# Patient Record
Sex: MALE | Race: WHITE | NOT HISPANIC OR LATINO | Employment: OTHER | URBAN - NONMETROPOLITAN AREA
[De-identification: names, ages, dates, MRNs, and addresses within clinical notes are randomized per-mention and may not be internally consistent; named-entity substitution may affect disease eponyms.]

---

## 2021-01-01 ENCOUNTER — LAB REQUISITION (OUTPATIENT)
Dept: LAB | Facility: HOSPITAL | Age: 81
End: 2021-01-01
Attending: FAMILY MEDICINE

## 2021-01-01 DIAGNOSIS — Z82.3 FAMILY HISTORY OF STROKE: ICD-10-CM

## 2021-01-01 DIAGNOSIS — D63.8 ANEMIA IN OTHER CHRONIC DISEASES CLASSIFIED ELSEWHERE: ICD-10-CM

## 2021-01-01 LAB
ALBUMIN SERPL BCP-MCNC: 2.4 G/DL (ref 3.5–5)
ALBUMIN/GLOB SERPL: 0.6 {RATIO}
ALP SERPL-CCNC: 84 U/L (ref 45–115)
ALT SERPL W P-5'-P-CCNC: 37 U/L (ref 16–61)
ANION GAP SERPL CALCULATED.3IONS-SCNC: 12 MMOL/L (ref 7–16)
AST SERPL W P-5'-P-CCNC: 17 U/L (ref 15–37)
BASOPHILS # BLD AUTO: 0.02 K/UL (ref 0–0.2)
BASOPHILS NFR BLD AUTO: 0.4 % (ref 0–1)
BILIRUB SERPL-MCNC: 0.2 MG/DL (ref 0–1.2)
BUN SERPL-MCNC: 24 MG/DL (ref 7–18)
BUN/CREAT SERPL: 23 (ref 6–20)
CALCIUM SERPL-MCNC: 8.3 MG/DL (ref 8.5–10.1)
CHLORIDE SERPL-SCNC: 103 MMOL/L (ref 98–107)
CO2 SERPL-SCNC: 29 MMOL/L (ref 21–32)
CREAT SERPL-MCNC: 1.04 MG/DL (ref 0.7–1.3)
DIFFERENTIAL METHOD BLD: ABNORMAL
EOSINOPHIL # BLD AUTO: 0.12 K/UL (ref 0–0.5)
EOSINOPHIL NFR BLD AUTO: 2.2 % (ref 1–4)
ERYTHROCYTE [DISTWIDTH] IN BLOOD BY AUTOMATED COUNT: 15.6 % (ref 11.5–14.5)
EST. AVERAGE GLUCOSE BLD GHB EST-MCNC: 100 MG/DL
GLOBULIN SER-MCNC: 3.8 G/DL (ref 2–4)
GLUCOSE SERPL-MCNC: 129 MG/DL (ref 74–106)
HBA1C MFR BLD HPLC: 5.6 % (ref 4.5–6.6)
HCT VFR BLD AUTO: 27.5 % (ref 40–54)
HGB BLD-MCNC: 8.9 G/DL (ref 13.5–18)
LYMPHOCYTES # BLD AUTO: 1.25 K/UL (ref 1–4.8)
LYMPHOCYTES NFR BLD AUTO: 22.6 % (ref 27–41)
MCH RBC QN AUTO: 26.9 PG (ref 27–31)
MCHC RBC AUTO-ENTMCNC: 32.4 G/DL (ref 32–36)
MCV RBC AUTO: 83.1 FL (ref 80–96)
MONOCYTES # BLD AUTO: 0.59 K/UL (ref 0–0.8)
MONOCYTES NFR BLD AUTO: 10.7 % (ref 2–6)
MPC BLD CALC-MCNC: 9.1 FL (ref 9.4–12.4)
NEUTROPHILS # BLD AUTO: 3.55 K/UL (ref 1.8–7.7)
NEUTROPHILS NFR BLD AUTO: 64.1 % (ref 53–65)
PLATELET # BLD AUTO: 245 K/UL (ref 150–400)
POTASSIUM SERPL-SCNC: 4 MMOL/L (ref 3.5–5.1)
PROT SERPL-MCNC: 6.2 G/DL (ref 6.4–8.2)
RBC # BLD AUTO: 3.31 M/UL (ref 4.6–6.2)
SODIUM SERPL-SCNC: 140 MMOL/L (ref 136–145)
WBC # BLD AUTO: 5.53 K/UL (ref 4.5–11)

## 2021-01-01 PROCEDURE — 83036 HEMOGLOBIN GLYCOSYLATED A1C: CPT | Performed by: FAMILY MEDICINE

## 2021-01-01 PROCEDURE — 80053 COMPREHEN METABOLIC PANEL: CPT | Performed by: FAMILY MEDICINE

## 2021-01-01 PROCEDURE — 85025 COMPLETE CBC W/AUTO DIFF WBC: CPT | Performed by: FAMILY MEDICINE

## 2022-01-01 ENCOUNTER — HOSPITAL ENCOUNTER (EMERGENCY)
Facility: HOSPITAL | Age: 82
Discharge: HOME | End: 2022-08-14
Payer: MEDICARE

## 2022-01-01 ENCOUNTER — HOSPITAL ENCOUNTER (INPATIENT)
Facility: HOSPITAL | Age: 82
LOS: 2 days | Discharge: HOME | DRG: 194 | End: 2022-03-01
Attending: FAMILY MEDICINE | Admitting: FAMILY MEDICINE
Payer: MEDICARE

## 2022-01-01 ENCOUNTER — TELEPHONE (OUTPATIENT)
Dept: EMERGENCY MEDICINE | Facility: HOSPITAL | Age: 82
End: 2022-01-01
Payer: MEDICARE

## 2022-01-01 ENCOUNTER — HOSPITAL ENCOUNTER (EMERGENCY)
Facility: HOSPITAL | Age: 82
Discharge: HOME | End: 2022-04-10
Payer: MEDICARE

## 2022-01-01 ENCOUNTER — HOSPITAL ENCOUNTER (EMERGENCY)
Facility: HOSPITAL | Age: 82
Discharge: HOME | End: 2022-07-15
Payer: MEDICARE

## 2022-01-01 ENCOUNTER — HOSPITAL ENCOUNTER (INPATIENT)
Facility: HOSPITAL | Age: 82
LOS: 1 days | Discharge: NURSING, REHAB OR LONG-TERM CARE | DRG: 057 | End: 2022-06-14
Attending: FAMILY MEDICINE | Admitting: FAMILY MEDICINE
Payer: MEDICARE

## 2022-01-01 VITALS
HEART RATE: 74 BPM | SYSTOLIC BLOOD PRESSURE: 118 MMHG | WEIGHT: 149 LBS | HEIGHT: 70 IN | BODY MASS INDEX: 21.33 KG/M2 | RESPIRATION RATE: 16 BRPM | OXYGEN SATURATION: 95 % | TEMPERATURE: 98 F | DIASTOLIC BLOOD PRESSURE: 62 MMHG

## 2022-01-01 VITALS
HEART RATE: 76 BPM | RESPIRATION RATE: 19 BRPM | DIASTOLIC BLOOD PRESSURE: 50 MMHG | WEIGHT: 149 LBS | TEMPERATURE: 98 F | HEIGHT: 70 IN | BODY MASS INDEX: 21.33 KG/M2 | SYSTOLIC BLOOD PRESSURE: 105 MMHG | OXYGEN SATURATION: 97 %

## 2022-01-01 VITALS
BODY MASS INDEX: 21.47 KG/M2 | OXYGEN SATURATION: 98 % | SYSTOLIC BLOOD PRESSURE: 112 MMHG | TEMPERATURE: 98 F | WEIGHT: 150 LBS | HEIGHT: 70 IN | HEART RATE: 88 BPM | DIASTOLIC BLOOD PRESSURE: 62 MMHG | RESPIRATION RATE: 16 BRPM

## 2022-01-01 VITALS
WEIGHT: 150 LBS | BODY MASS INDEX: 21.47 KG/M2 | HEIGHT: 70 IN | DIASTOLIC BLOOD PRESSURE: 68 MMHG | RESPIRATION RATE: 12 BRPM | OXYGEN SATURATION: 99 % | HEART RATE: 95 BPM | TEMPERATURE: 98 F | SYSTOLIC BLOOD PRESSURE: 123 MMHG

## 2022-01-01 VITALS
RESPIRATION RATE: 20 BRPM | SYSTOLIC BLOOD PRESSURE: 114 MMHG | HEART RATE: 87 BPM | TEMPERATURE: 98 F | DIASTOLIC BLOOD PRESSURE: 46 MMHG | WEIGHT: 152 LBS | BODY MASS INDEX: 20.59 KG/M2 | OXYGEN SATURATION: 96 % | HEIGHT: 72 IN

## 2022-01-01 DIAGNOSIS — E86.0 DEHYDRATION: Primary | ICD-10-CM

## 2022-01-01 DIAGNOSIS — N17.9 AKI (ACUTE KIDNEY INJURY): ICD-10-CM

## 2022-01-01 DIAGNOSIS — I95.9 HYPOTENSION: ICD-10-CM

## 2022-01-01 DIAGNOSIS — Z86.59 MENTAL STATUS CHANGE RESOLVED: ICD-10-CM

## 2022-01-01 DIAGNOSIS — R19.5 POSITIVE OCCULT STOOL BLOOD TEST: Primary | ICD-10-CM

## 2022-01-01 DIAGNOSIS — R41.82 ALTERED MENTAL STATUS, UNSPECIFIED: Primary | ICD-10-CM

## 2022-01-01 DIAGNOSIS — J18.9 PNEUMONIA OF LEFT LOWER LOBE DUE TO INFECTIOUS ORGANISM: Primary | ICD-10-CM

## 2022-01-01 DIAGNOSIS — R11.10 VOMITING, INTRACTABILITY OF VOMITING NOT SPECIFIED, PRESENCE OF NAUSEA NOT SPECIFIED, UNSPECIFIED VOMITING TYPE: ICD-10-CM

## 2022-01-01 DIAGNOSIS — D64.9 SYMPTOMATIC ANEMIA: Primary | ICD-10-CM

## 2022-01-01 DIAGNOSIS — I95.9 HYPOTENSIVE EPISODE: ICD-10-CM

## 2022-01-01 DIAGNOSIS — R19.5 OCCULT BLOOD POSITIVE STOOL: ICD-10-CM

## 2022-01-01 DIAGNOSIS — D64.9 ANEMIA: ICD-10-CM

## 2022-01-01 LAB
ALBUMIN SERPL BCP-MCNC: 1.8 G/DL (ref 3.5–5)
ALBUMIN SERPL BCP-MCNC: 2.2 G/DL (ref 3.5–5)
ALBUMIN/GLOB SERPL: 0.4 {RATIO}
ALBUMIN/GLOB SERPL: 0.4 {RATIO}
ALBUMIN/GLOB SERPL: 0.5 {RATIO}
ALBUMIN/GLOB SERPL: 0.5 {RATIO}
ALP SERPL-CCNC: 73 U/L (ref 45–115)
ALP SERPL-CCNC: 74 U/L (ref 45–115)
ALP SERPL-CCNC: 93 U/L (ref 45–115)
ALP SERPL-CCNC: 96 U/L (ref 45–115)
ALT SERPL W P-5'-P-CCNC: 10 U/L (ref 16–61)
ALT SERPL W P-5'-P-CCNC: 5 U/L (ref 16–61)
ALT SERPL W P-5'-P-CCNC: 7 U/L (ref 16–61)
ALT SERPL W P-5'-P-CCNC: 9 U/L (ref 16–61)
ANION GAP SERPL CALCULATED.3IONS-SCNC: 10 MMOL/L (ref 7–16)
ANION GAP SERPL CALCULATED.3IONS-SCNC: 10 MMOL/L (ref 7–16)
ANION GAP SERPL CALCULATED.3IONS-SCNC: 11 MMOL/L (ref 7–16)
ANION GAP SERPL CALCULATED.3IONS-SCNC: 12 MMOL/L (ref 7–16)
ANION GAP SERPL CALCULATED.3IONS-SCNC: 13 MMOL/L (ref 7–16)
ANISOCYTOSIS BLD QL SMEAR: ABNORMAL
ANISOCYTOSIS BLD QL SMEAR: ABNORMAL
AST SERPL W P-5'-P-CCNC: 12 U/L (ref 15–37)
AST SERPL W P-5'-P-CCNC: 12 U/L (ref 15–37)
AST SERPL W P-5'-P-CCNC: 13 U/L (ref 15–37)
AST SERPL W P-5'-P-CCNC: 17 U/L (ref 15–37)
BACTERIA #/AREA URNS HPF: ABNORMAL /HPF
BACTERIA BLD CULT: NORMAL
BACTERIA BLD CULT: NORMAL
BASOPHILS # BLD AUTO: 0.01 K/UL (ref 0–0.2)
BASOPHILS # BLD AUTO: 0.01 K/UL (ref 0–0.2)
BASOPHILS # BLD AUTO: 0.02 K/UL (ref 0–0.2)
BASOPHILS # BLD AUTO: 0.07 K/UL (ref 0–0.2)
BASOPHILS NFR BLD AUTO: 0.2 % (ref 0–1)
BASOPHILS NFR BLD AUTO: 0.4 % (ref 0–1)
BASOPHILS NFR BLD AUTO: 0.4 % (ref 0–1)
BASOPHILS NFR BLD AUTO: 0.5 % (ref 0–1)
BASOPHILS NFR BLD AUTO: 1.1 % (ref 0–1)
BILIRUB SERPL-MCNC: 0.2 MG/DL (ref 0–1.2)
BILIRUB SERPL-MCNC: 0.3 MG/DL (ref 0–1.2)
BILIRUB SERPL-MCNC: 0.6 MG/DL (ref 0–1.2)
BILIRUB SERPL-MCNC: 0.7 MG/DL (ref 0–1.2)
BILIRUB UR QL STRIP: ABNORMAL
BUN SERPL-MCNC: 14 MG/DL (ref 7–18)
BUN SERPL-MCNC: 17 MG/DL (ref 7–18)
BUN SERPL-MCNC: 21 MG/DL (ref 7–18)
BUN SERPL-MCNC: 25 MG/DL (ref 7–18)
BUN SERPL-MCNC: 33 MG/DL (ref 7–18)
BUN/CREAT SERPL: 18 (ref 6–20)
BUN/CREAT SERPL: 24 (ref 6–20)
BUN/CREAT SERPL: 25 (ref 6–20)
BUN/CREAT SERPL: 26 (ref 6–20)
BUN/CREAT SERPL: 28 (ref 6–20)
BUN/CREAT SERPL: 30 (ref 6–20)
BUN/CREAT SERPL: 32 (ref 6–20)
CALCIUM SERPL-MCNC: 10.1 MG/DL (ref 8.5–10.1)
CALCIUM SERPL-MCNC: 8.1 MG/DL (ref 8.5–10.1)
CALCIUM SERPL-MCNC: 8.5 MG/DL (ref 8.5–10.1)
CALCIUM SERPL-MCNC: 8.7 MG/DL (ref 8.5–10.1)
CALCIUM SERPL-MCNC: 9.2 MG/DL (ref 8.5–10.1)
CALCIUM SERPL-MCNC: 9.2 MG/DL (ref 8.5–10.1)
CALCIUM SERPL-MCNC: 9.8 MG/DL (ref 8.5–10.1)
CHLORIDE SERPL-SCNC: 104 MMOL/L (ref 98–107)
CHLORIDE SERPL-SCNC: 105 MMOL/L (ref 98–107)
CHLORIDE SERPL-SCNC: 105 MMOL/L (ref 98–107)
CHLORIDE SERPL-SCNC: 107 MMOL/L (ref 98–107)
CHLORIDE SERPL-SCNC: 107 MMOL/L (ref 98–107)
CHLORIDE SERPL-SCNC: 108 MMOL/L (ref 98–107)
CHLORIDE SERPL-SCNC: 111 MMOL/L (ref 98–107)
CLARITY UR: ABNORMAL
CO2 SERPL-SCNC: 27 MMOL/L (ref 21–32)
CO2 SERPL-SCNC: 27 MMOL/L (ref 21–32)
CO2 SERPL-SCNC: 28 MMOL/L (ref 21–32)
CO2 SERPL-SCNC: 29 MMOL/L (ref 21–32)
CO2 SERPL-SCNC: 30 MMOL/L (ref 21–32)
CO2 SERPL-SCNC: 30 MMOL/L (ref 21–32)
CO2 SERPL-SCNC: 32 MMOL/L (ref 21–32)
COLOR UR: YELLOW
CREAT SERPL-MCNC: 0.7 MG/DL (ref 0.7–1.3)
CREAT SERPL-MCNC: 0.8 MG/DL (ref 0.7–1.3)
CREAT SERPL-MCNC: 0.83 MG/DL (ref 0.7–1.3)
CREAT SERPL-MCNC: 0.83 MG/DL (ref 0.7–1.3)
CREAT SERPL-MCNC: 0.9 MG/DL (ref 0.7–1.3)
CREAT SERPL-MCNC: 0.97 MG/DL (ref 0.7–1.3)
CREAT SERPL-MCNC: 1.02 MG/DL (ref 0.7–1.3)
DIFFERENTIAL METHOD BLD: ABNORMAL
EGFR (NO RACE VARIABLE) (RUSH/TITUS): 74 ML/MIN/1.73M²
EOSINOPHIL # BLD AUTO: 0.04 K/UL (ref 0–0.5)
EOSINOPHIL # BLD AUTO: 0.07 K/UL (ref 0–0.5)
EOSINOPHIL # BLD AUTO: 0.07 K/UL (ref 0–0.5)
EOSINOPHIL # BLD AUTO: 0.09 K/UL (ref 0–0.5)
EOSINOPHIL # BLD AUTO: 0.09 K/UL (ref 0–0.5)
EOSINOPHIL # BLD AUTO: 0.22 K/UL (ref 0–0.5)
EOSINOPHIL # BLD AUTO: 0.25 K/UL (ref 0–0.5)
EOSINOPHIL NFR BLD AUTO: 0.4 % (ref 1–4)
EOSINOPHIL NFR BLD AUTO: 1.1 % (ref 1–4)
EOSINOPHIL NFR BLD AUTO: 1.5 % (ref 1–4)
EOSINOPHIL NFR BLD AUTO: 1.6 % (ref 1–4)
EOSINOPHIL NFR BLD AUTO: 1.7 % (ref 1–4)
EOSINOPHIL NFR BLD AUTO: 4.7 % (ref 1–4)
EOSINOPHIL NFR BLD AUTO: 5 % (ref 1–4)
EOSINOPHIL NFR BLD MANUAL: 4 % (ref 1–4)
ERYTHROCYTE [DISTWIDTH] IN BLOOD BY AUTOMATED COUNT: 15.7 % (ref 11.5–14.5)
ERYTHROCYTE [DISTWIDTH] IN BLOOD BY AUTOMATED COUNT: 17 % (ref 11.5–14.5)
ERYTHROCYTE [DISTWIDTH] IN BLOOD BY AUTOMATED COUNT: 18.1 % (ref 11.5–14.5)
ERYTHROCYTE [DISTWIDTH] IN BLOOD BY AUTOMATED COUNT: 18.5 % (ref 11.5–14.5)
ERYTHROCYTE [DISTWIDTH] IN BLOOD BY AUTOMATED COUNT: 20 % (ref 11.5–14.5)
ERYTHROCYTE [DISTWIDTH] IN BLOOD BY AUTOMATED COUNT: 20.7 % (ref 11.5–14.5)
ERYTHROCYTE [DISTWIDTH] IN BLOOD BY AUTOMATED COUNT: 21 % (ref 11.5–14.5)
FERRITIN SERPL-MCNC: 115 NG/ML (ref 26–388)
FLUAV AG UPPER RESP QL IA.RAPID: NEGATIVE
FLUBV AG UPPER RESP QL IA.RAPID: NEGATIVE
GLOBULIN SER-MCNC: 3.7 G/DL (ref 2–4)
GLOBULIN SER-MCNC: 4.4 G/DL (ref 2–4)
GLOBULIN SER-MCNC: 5.3 G/DL (ref 2–4)
GLOBULIN SER-MCNC: 5.5 G/DL (ref 2–4)
GLUCOSE SERPL-MCNC: 107 MG/DL (ref 74–106)
GLUCOSE SERPL-MCNC: 123 MG/DL (ref 74–106)
GLUCOSE SERPL-MCNC: 126 MG/DL (ref 70–105)
GLUCOSE SERPL-MCNC: 131 MG/DL (ref 74–106)
GLUCOSE SERPL-MCNC: 71 MG/DL (ref 74–106)
GLUCOSE SERPL-MCNC: 72 MG/DL (ref 74–106)
GLUCOSE SERPL-MCNC: 72 MG/DL (ref 74–106)
GLUCOSE SERPL-MCNC: 80 MG/DL (ref 74–106)
GLUCOSE UR STRIP-MCNC: NEGATIVE MG/DL
GROUP & RH: NORMAL
HCT VFR BLD AUTO: 25.7 % (ref 40–54)
HCT VFR BLD AUTO: 27.8 % (ref 40–54)
HCT VFR BLD AUTO: 27.9 % (ref 40–54)
HCT VFR BLD AUTO: 29.1 % (ref 40–54)
HCT VFR BLD AUTO: 30 % (ref 40–54)
HCT VFR BLD AUTO: 32.4 % (ref 40–54)
HCT VFR BLD AUTO: 35.7 % (ref 40–54)
HGB BLD-MCNC: 10.2 G/DL (ref 13.5–18)
HGB BLD-MCNC: 10.6 G/DL (ref 13.5–18)
HGB BLD-MCNC: 8.5 G/DL (ref 13.5–18)
HGB BLD-MCNC: 8.8 G/DL (ref 13.5–18)
HGB BLD-MCNC: 9 G/DL (ref 13.5–18)
HGB BLD-MCNC: 9.2 G/DL (ref 13.5–18)
HGB BLD-MCNC: 9.8 G/DL (ref 13.5–18)
HYPOCHROMIA BLD QL SMEAR: ABNORMAL
IMM RETICS NFR: 23.3 % (ref 2.3–13.4)
INDIRECT COOMBS GEL: NEGATIVE
IRON SATN MFR SERPL: 11 % (ref 14–50)
IRON SERPL-MCNC: 16 ΜG/DL (ref 65–175)
KETONES UR STRIP-SCNC: 15 MG/DL
LACTATE SERPL-SCNC: 1 MMOL/L (ref 0.4–2)
LEUKOCYTE ESTERASE UR QL STRIP: NEGATIVE
LIPASE SERPL-CCNC: 15 U/L (ref 73–393)
LYMPHOCYTES # BLD AUTO: 0.83 K/UL (ref 1–4.8)
LYMPHOCYTES # BLD AUTO: 0.86 K/UL (ref 1–4.8)
LYMPHOCYTES # BLD AUTO: 0.86 K/UL (ref 1–4.8)
LYMPHOCYTES # BLD AUTO: 0.91 K/UL (ref 1–4.8)
LYMPHOCYTES # BLD AUTO: 1.1 K/UL (ref 1–4.8)
LYMPHOCYTES # BLD AUTO: 1.18 K/UL (ref 1–4.8)
LYMPHOCYTES # BLD AUTO: 1.51 K/UL (ref 1–4.8)
LYMPHOCYTES NFR BLD AUTO: 15.4 % (ref 27–41)
LYMPHOCYTES NFR BLD AUTO: 17.3 % (ref 27–41)
LYMPHOCYTES NFR BLD AUTO: 18.6 % (ref 27–41)
LYMPHOCYTES NFR BLD AUTO: 22.1 % (ref 27–41)
LYMPHOCYTES NFR BLD AUTO: 24.2 % (ref 27–41)
LYMPHOCYTES NFR BLD AUTO: 24.9 % (ref 27–41)
LYMPHOCYTES NFR BLD AUTO: 9.2 % (ref 27–41)
LYMPHOCYTES NFR BLD MANUAL: 17 % (ref 27–41)
LYMPHOCYTES NFR BLD MANUAL: 19 % (ref 27–41)
LYMPHOCYTES NFR BLD MANUAL: 22 % (ref 27–41)
LYMPHOCYTES NFR BLD MANUAL: 27 % (ref 27–41)
MAGNESIUM SERPL-MCNC: 1.8 MG/DL (ref 1.7–2.3)
MAGNESIUM SERPL-MCNC: 1.8 MG/DL (ref 1.7–2.3)
MAGNESIUM SERPL-MCNC: 2.1 MG/DL (ref 1.7–2.3)
MCH RBC QN AUTO: 25.3 PG (ref 27–31)
MCH RBC QN AUTO: 25.6 PG (ref 27–31)
MCH RBC QN AUTO: 25.8 PG (ref 27–31)
MCH RBC QN AUTO: 26.2 PG (ref 27–31)
MCH RBC QN AUTO: 26.9 PG (ref 27–31)
MCH RBC QN AUTO: 31.8 PG (ref 27–31)
MCH RBC QN AUTO: 32.4 PG (ref 27–31)
MCHC RBC AUTO-ENTMCNC: 28.6 G/DL (ref 32–36)
MCHC RBC AUTO-ENTMCNC: 30.2 G/DL (ref 32–36)
MCHC RBC AUTO-ENTMCNC: 30.5 G/DL (ref 32–36)
MCHC RBC AUTO-ENTMCNC: 30.7 G/DL (ref 32–36)
MCHC RBC AUTO-ENTMCNC: 31.7 G/DL (ref 32–36)
MCHC RBC AUTO-ENTMCNC: 35 G/DL (ref 32–36)
MCHC RBC AUTO-ENTMCNC: 36.4 G/DL (ref 32–36)
MCV RBC AUTO: 83.6 FL (ref 80–96)
MCV RBC AUTO: 83.7 FL (ref 80–96)
MCV RBC AUTO: 85 FL (ref 80–96)
MCV RBC AUTO: 85.8 FL (ref 80–96)
MCV RBC AUTO: 89 FL (ref 80–96)
MCV RBC AUTO: 90.2 FL (ref 80–96)
MCV RBC AUTO: 90.8 FL (ref 80–96)
MICROCYTES BLD QL SMEAR: ABNORMAL
MICROCYTES BLD QL SMEAR: ABNORMAL
MONOCYTES # BLD AUTO: 0.45 K/UL (ref 0–0.8)
MONOCYTES # BLD AUTO: 0.46 K/UL (ref 0–0.8)
MONOCYTES # BLD AUTO: 0.46 K/UL (ref 0–0.8)
MONOCYTES # BLD AUTO: 0.48 K/UL (ref 0–0.8)
MONOCYTES # BLD AUTO: 0.52 K/UL (ref 0–0.8)
MONOCYTES # BLD AUTO: 0.59 K/UL (ref 0–0.8)
MONOCYTES # BLD AUTO: 0.76 K/UL (ref 0–0.8)
MONOCYTES NFR BLD AUTO: 11.2 % (ref 2–6)
MONOCYTES NFR BLD AUTO: 11.8 % (ref 2–6)
MONOCYTES NFR BLD AUTO: 13.6 % (ref 2–6)
MONOCYTES NFR BLD AUTO: 5.3 % (ref 2–6)
MONOCYTES NFR BLD AUTO: 7.4 % (ref 2–6)
MONOCYTES NFR BLD AUTO: 8.6 % (ref 2–6)
MONOCYTES NFR BLD AUTO: 9.7 % (ref 2–6)
MONOCYTES NFR BLD MANUAL: 15 % (ref 2–6)
MONOCYTES NFR BLD MANUAL: 6 % (ref 2–6)
MONOCYTES NFR BLD MANUAL: 6 % (ref 2–6)
MONOCYTES NFR BLD MANUAL: 8 % (ref 2–6)
MPC BLD CALC-MCNC: 8.6 FL (ref 9.4–12.4)
MPC BLD CALC-MCNC: 8.8 FL (ref 9.4–12.4)
MPC BLD CALC-MCNC: 8.9 FL (ref 9.4–12.4)
MPC BLD CALC-MCNC: 9 FL (ref 9.4–12.4)
MPC BLD CALC-MCNC: 9.1 FL (ref 9.4–12.4)
MPC BLD CALC-MCNC: 9.2 FL (ref 9.4–12.4)
MPC BLD CALC-MCNC: 9.3 FL (ref 9.4–12.4)
MUCOUS THREADS #/AREA URNS HPF: ABNORMAL /HPF
NEUTROPHILS # BLD AUTO: 2.56 K/UL (ref 1.8–7.7)
NEUTROPHILS # BLD AUTO: 3.24 K/UL (ref 1.8–7.7)
NEUTROPHILS # BLD AUTO: 3.42 K/UL (ref 1.8–7.7)
NEUTROPHILS # BLD AUTO: 3.65 K/UL (ref 1.8–7.7)
NEUTROPHILS # BLD AUTO: 3.85 K/UL (ref 1.8–7.7)
NEUTROPHILS # BLD AUTO: 4.14 K/UL (ref 1.8–7.7)
NEUTROPHILS # BLD AUTO: 7.69 K/UL (ref 1.8–7.7)
NEUTROPHILS NFR BLD AUTO: 57.8 % (ref 53–65)
NEUTROPHILS NFR BLD AUTO: 64.2 % (ref 53–65)
NEUTROPHILS NFR BLD AUTO: 66.2 % (ref 53–65)
NEUTROPHILS NFR BLD AUTO: 69 % (ref 53–65)
NEUTROPHILS NFR BLD AUTO: 69.6 % (ref 53–65)
NEUTROPHILS NFR BLD AUTO: 70 % (ref 53–65)
NEUTROPHILS NFR BLD AUTO: 84.9 % (ref 53–65)
NEUTS BAND NFR BLD MANUAL: 3 % (ref 1–5)
NEUTS BAND NFR BLD MANUAL: 5 % (ref 1–5)
NEUTS SEG NFR BLD MANUAL: 61 % (ref 50–62)
NEUTS SEG NFR BLD MANUAL: 67 % (ref 50–62)
NEUTS SEG NFR BLD MANUAL: 68 % (ref 50–62)
NEUTS SEG NFR BLD MANUAL: 72 % (ref 50–62)
NITRITE UR QL STRIP: POSITIVE
NRBC BLD MANUAL-RTO: ABNORMAL %
NT-PROBNP SERPL-MCNC: 549 PG/ML (ref 1–450)
NT-PROBNP SERPL-MCNC: 633 PG/ML (ref 1–450)
OCCULT BLOOD: POSITIVE
PH UR STRIP: 6 PH UNITS
PLATELET # BLD AUTO: 207 K/UL (ref 150–400)
PLATELET # BLD AUTO: 226 K/UL (ref 150–400)
PLATELET # BLD AUTO: 232 K/UL (ref 150–400)
PLATELET # BLD AUTO: 266 K/UL (ref 150–400)
PLATELET # BLD AUTO: 279 K/UL (ref 150–400)
PLATELET # BLD AUTO: 301 K/UL (ref 150–400)
PLATELET # BLD AUTO: 319 K/UL (ref 150–400)
PLATELET MORPHOLOGY: NORMAL
POIKILOCYTOSIS BLD QL SMEAR: ABNORMAL
POTASSIUM SERPL-SCNC: 3.4 MMOL/L (ref 3.5–5.1)
POTASSIUM SERPL-SCNC: 3.6 MMOL/L (ref 3.5–5.1)
POTASSIUM SERPL-SCNC: 3.7 MMOL/L (ref 3.5–5.1)
POTASSIUM SERPL-SCNC: 3.8 MMOL/L (ref 3.5–5.1)
POTASSIUM SERPL-SCNC: 3.8 MMOL/L (ref 3.5–5.1)
POTASSIUM SERPL-SCNC: 3.9 MMOL/L (ref 3.5–5.1)
POTASSIUM SERPL-SCNC: 4.3 MMOL/L (ref 3.5–5.1)
PREALB SERPL NEPH-MCNC: 7 MG/DL (ref 20–40)
PROT SERPL-MCNC: 5.5 G/DL (ref 6.4–8.2)
PROT SERPL-MCNC: 6.6 G/DL (ref 6.4–8.2)
PROT SERPL-MCNC: 7.5 G/DL (ref 6.4–8.2)
PROT SERPL-MCNC: 7.7 G/DL (ref 6.4–8.2)
PROT UR QL STRIP: ABNORMAL
RBC # BLD AUTO: 2.83 M/UL (ref 4.6–6.2)
RBC # BLD AUTO: 3.25 M/UL (ref 4.6–6.2)
RBC # BLD AUTO: 3.27 M/UL (ref 4.6–6.2)
RBC # BLD AUTO: 3.27 M/UL (ref 4.6–6.2)
RBC # BLD AUTO: 3.59 M/UL (ref 4.6–6.2)
RBC # BLD AUTO: 3.87 M/UL (ref 4.6–6.2)
RBC # BLD AUTO: 3.96 M/UL (ref 4.6–6.2)
RBC # UR STRIP: NEGATIVE /UL
RBC #/AREA URNS HPF: ABNORMAL /HPF
RETICS # AUTO: 0.04 X10E6/UL (ref 0.02–0.11)
RETICS/RBC NFR AUTO: 1.5 % (ref 0.4–2.2)
SARS-COV+SARS-COV-2 AG RESP QL IA.RAPID: NEGATIVE
SARS-COV+SARS-COV-2 AG RESP QL IA.RAPID: NEGATIVE
SODIUM SERPL-SCNC: 139 MMOL/L (ref 136–145)
SODIUM SERPL-SCNC: 141 MMOL/L (ref 136–145)
SODIUM SERPL-SCNC: 142 MMOL/L (ref 136–145)
SODIUM SERPL-SCNC: 143 MMOL/L (ref 136–145)
SODIUM SERPL-SCNC: 144 MMOL/L (ref 136–145)
SODIUM SERPL-SCNC: 146 MMOL/L (ref 136–145)
SODIUM SERPL-SCNC: 150 MMOL/L (ref 136–145)
SP GR UR STRIP: >=1.03
SQUAMOUS #/AREA URNS LPF: ABNORMAL /LPF
TIBC SERPL-MCNC: 140 ΜG/DL (ref 250–450)
TROPONIN I SERPL HS-MCNC: 4.3 PG/ML
TROPONIN I SERPL HS-MCNC: 6.9 PG/ML
TROPONIN I SERPL HS-MCNC: 7.7 PG/ML
UROBILINOGEN UR STRIP-ACNC: 0.2 MG/DL
WBC # BLD AUTO: 4.42 K/UL (ref 4.5–11)
WBC # BLD AUTO: 4.63 K/UL (ref 4.5–11)
WBC # BLD AUTO: 5.25 K/UL (ref 4.5–11)
WBC # BLD AUTO: 5.33 K/UL (ref 4.5–11)
WBC # BLD AUTO: 5.58 K/UL (ref 4.5–11)
WBC # BLD AUTO: 6.25 K/UL (ref 4.5–11)
WBC # BLD AUTO: 9.06 K/UL (ref 4.5–11)
WBC #/AREA URNS HPF: ABNORMAL /HPF

## 2022-01-01 PROCEDURE — 99285 EMERGENCY DEPT VISIT HI MDM: CPT | Mod: 25

## 2022-01-01 PROCEDURE — 84484 ASSAY OF TROPONIN QUANT: CPT | Performed by: NURSE PRACTITIONER

## 2022-01-01 PROCEDURE — 93010 EKG 12-LEAD: ICD-10-PCS | Mod: ,,, | Performed by: INTERNAL MEDICINE

## 2022-01-01 PROCEDURE — 36415 COLL VENOUS BLD VENIPUNCTURE: CPT | Performed by: NURSE PRACTITIONER

## 2022-01-01 PROCEDURE — 80048 BASIC METABOLIC PNL TOTAL CA: CPT | Performed by: NURSE PRACTITIONER

## 2022-01-01 PROCEDURE — 92610 EVALUATE SWALLOWING FUNCTION: CPT

## 2022-01-01 PROCEDURE — 82962 GLUCOSE BLOOD TEST: CPT

## 2022-01-01 PROCEDURE — 80053 COMPREHEN METABOLIC PANEL: CPT | Performed by: NURSE PRACTITIONER

## 2022-01-01 PROCEDURE — 99283 EMERGENCY DEPT VISIT LOW MDM: CPT | Performed by: NURSE PRACTITIONER

## 2022-01-01 PROCEDURE — 93010 EKG 12-LEAD: ICD-10-PCS | Mod: ,,, | Performed by: STUDENT IN AN ORGANIZED HEALTH CARE EDUCATION/TRAINING PROGRAM

## 2022-01-01 PROCEDURE — 27000221 HC OXYGEN, UP TO 24 HOURS

## 2022-01-01 PROCEDURE — 93005 ELECTROCARDIOGRAM TRACING: CPT

## 2022-01-01 PROCEDURE — 99222 PR INITIAL HOSPITAL CARE,LEVL II: ICD-10-PCS | Mod: AI,,, | Performed by: FAMILY MEDICINE

## 2022-01-01 PROCEDURE — 25000003 PHARM REV CODE 250: Performed by: REGISTERED NURSE

## 2022-01-01 PROCEDURE — 99238 HOSP IP/OBS DSCHRG MGMT 30/<: CPT | Mod: ,,, | Performed by: EMERGENCY MEDICINE

## 2022-01-01 PROCEDURE — 97165 OT EVAL LOW COMPLEX 30 MIN: CPT

## 2022-01-01 PROCEDURE — 94761 N-INVAS EAR/PLS OXIMETRY MLT: CPT

## 2022-01-01 PROCEDURE — 11000001 HC ACUTE MED/SURG PRIVATE ROOM

## 2022-01-01 PROCEDURE — 99284 EMERGENCY DEPT VISIT MOD MDM: CPT | Mod: GF | Performed by: NURSE PRACTITIONER

## 2022-01-01 PROCEDURE — 96360 HYDRATION IV INFUSION INIT: CPT

## 2022-01-01 PROCEDURE — 99222 1ST HOSP IP/OBS MODERATE 55: CPT | Mod: AI,,, | Performed by: FAMILY MEDICINE

## 2022-01-01 PROCEDURE — 83735 ASSAY OF MAGNESIUM: CPT | Performed by: REGISTERED NURSE

## 2022-01-01 PROCEDURE — 99900035 HC TECH TIME PER 15 MIN (STAT)

## 2022-01-01 PROCEDURE — 85025 COMPLETE CBC W/AUTO DIFF WBC: CPT | Performed by: NURSE PRACTITIONER

## 2022-01-01 PROCEDURE — 86920 COMPATIBILITY TEST SPIN: CPT | Performed by: FAMILY MEDICINE

## 2022-01-01 PROCEDURE — 25000003 PHARM REV CODE 250: Performed by: NURSE PRACTITIONER

## 2022-01-01 PROCEDURE — 93010 ELECTROCARDIOGRAM REPORT: CPT | Mod: ,,, | Performed by: STUDENT IN AN ORGANIZED HEALTH CARE EDUCATION/TRAINING PROGRAM

## 2022-01-01 PROCEDURE — 63600175 PHARM REV CODE 636 W HCPCS: Performed by: REGISTERED NURSE

## 2022-01-01 PROCEDURE — 63600175 PHARM REV CODE 636 W HCPCS: Performed by: FAMILY MEDICINE

## 2022-01-01 PROCEDURE — 85045 AUTOMATED RETICULOCYTE COUNT: CPT | Performed by: NURSE PRACTITIONER

## 2022-01-01 PROCEDURE — 82728 ASSAY OF FERRITIN: CPT | Performed by: NURSE PRACTITIONER

## 2022-01-01 PROCEDURE — 81001 URINALYSIS AUTO W/SCOPE: CPT | Performed by: NURSE PRACTITIONER

## 2022-01-01 PROCEDURE — 87428 SARSCOV & INF VIR A&B AG IA: CPT | Performed by: REGISTERED NURSE

## 2022-01-01 PROCEDURE — 87426 SARSCOV CORONAVIRUS AG IA: CPT | Performed by: NURSE PRACTITIONER

## 2022-01-01 PROCEDURE — 83735 ASSAY OF MAGNESIUM: CPT | Performed by: NURSE PRACTITIONER

## 2022-01-01 PROCEDURE — 83880 ASSAY OF NATRIURETIC PEPTIDE: CPT | Performed by: NURSE PRACTITIONER

## 2022-01-01 PROCEDURE — 83540 ASSAY OF IRON: CPT | Performed by: NURSE PRACTITIONER

## 2022-01-01 PROCEDURE — 86850 RBC ANTIBODY SCREEN: CPT | Performed by: FAMILY MEDICINE

## 2022-01-01 PROCEDURE — 99222 1ST HOSP IP/OBS MODERATE 55: CPT | Mod: ,,, | Performed by: NURSE PRACTITIONER

## 2022-01-01 PROCEDURE — P9016 RBC LEUKOCYTES REDUCED: HCPCS | Performed by: FAMILY MEDICINE

## 2022-01-01 PROCEDURE — 36430 TRANSFUSION BLD/BLD COMPNT: CPT

## 2022-01-01 PROCEDURE — 99222 PR INITIAL HOSPITAL CARE,LEVL II: ICD-10-PCS | Mod: ,,, | Performed by: NURSE PRACTITIONER

## 2022-01-01 PROCEDURE — 80048 BASIC METABOLIC PNL TOTAL CA: CPT | Performed by: REGISTERED NURSE

## 2022-01-01 PROCEDURE — 80053 COMPREHEN METABOLIC PANEL: CPT | Performed by: REGISTERED NURSE

## 2022-01-01 PROCEDURE — 85025 COMPLETE CBC W/AUTO DIFF WBC: CPT | Performed by: REGISTERED NURSE

## 2022-01-01 PROCEDURE — 97162 PT EVAL MOD COMPLEX 30 MIN: CPT

## 2022-01-01 PROCEDURE — 86900 BLOOD TYPING SEROLOGIC ABO: CPT | Performed by: FAMILY MEDICINE

## 2022-01-01 PROCEDURE — 99239 PR HOSPITAL DISCHARGE DAY,>30 MIN: ICD-10-PCS | Mod: ,,, | Performed by: NURSE PRACTITIONER

## 2022-01-01 PROCEDURE — 36415 COLL VENOUS BLD VENIPUNCTURE: CPT | Performed by: REGISTERED NURSE

## 2022-01-01 PROCEDURE — 87040 BLOOD CULTURE FOR BACTERIA: CPT | Performed by: NURSE PRACTITIONER

## 2022-01-01 PROCEDURE — 86901 BLOOD TYPING SEROLOGIC RH(D): CPT | Performed by: FAMILY MEDICINE

## 2022-01-01 PROCEDURE — 84484 ASSAY OF TROPONIN QUANT: CPT | Performed by: REGISTERED NURSE

## 2022-01-01 PROCEDURE — 83605 ASSAY OF LACTIC ACID: CPT | Performed by: NURSE PRACTITIONER

## 2022-01-01 PROCEDURE — 99238 PR HOSPITAL DISCHARGE DAY,<30 MIN: ICD-10-PCS | Mod: ,,, | Performed by: EMERGENCY MEDICINE

## 2022-01-01 PROCEDURE — 96365 THER/PROPH/DIAG IV INF INIT: CPT

## 2022-01-01 PROCEDURE — 96361 HYDRATE IV INFUSION ADD-ON: CPT

## 2022-01-01 PROCEDURE — 84134 ASSAY OF PREALBUMIN: CPT | Performed by: NURSE PRACTITIONER

## 2022-01-01 PROCEDURE — 99285 EMERGENCY DEPT VISIT HI MDM: CPT | Mod: GF | Performed by: NURSE PRACTITIONER

## 2022-01-01 PROCEDURE — 93010 ELECTROCARDIOGRAM REPORT: CPT | Mod: ,,, | Performed by: INTERNAL MEDICINE

## 2022-01-01 PROCEDURE — 99239 HOSP IP/OBS DSCHRG MGMT >30: CPT | Mod: ,,, | Performed by: NURSE PRACTITIONER

## 2022-01-01 PROCEDURE — 99283 EMERGENCY DEPT VISIT LOW MDM: CPT | Mod: GF | Performed by: REGISTERED NURSE

## 2022-01-01 PROCEDURE — 83690 ASSAY OF LIPASE: CPT | Performed by: NURSE PRACTITIONER

## 2022-01-01 RX ORDER — FAMOTIDINE 20 MG/1
20 TABLET, FILM COATED ORAL 2 TIMES DAILY
Status: DISCONTINUED | OUTPATIENT
Start: 2022-01-01 | End: 2022-01-01 | Stop reason: HOSPADM

## 2022-01-01 RX ORDER — ACETAMINOPHEN 325 MG/1
650 TABLET ORAL EVERY 6 HOURS PRN
Status: DISCONTINUED | OUTPATIENT
Start: 2022-01-01 | End: 2022-01-01 | Stop reason: HOSPADM

## 2022-01-01 RX ORDER — TALC
6 POWDER (GRAM) TOPICAL NIGHTLY PRN
Status: DISCONTINUED | OUTPATIENT
Start: 2022-01-01 | End: 2022-01-01 | Stop reason: HOSPADM

## 2022-01-01 RX ORDER — AMOXICILLIN 250 MG
1 CAPSULE ORAL 2 TIMES DAILY
Status: DISCONTINUED | OUTPATIENT
Start: 2022-01-01 | End: 2022-01-01 | Stop reason: HOSPADM

## 2022-01-01 RX ORDER — LEVOFLOXACIN 500 MG/1
500 TABLET, FILM COATED ORAL DAILY
Qty: 5 TABLET | Refills: 0 | OUTPATIENT
Start: 2022-01-01 | End: 2022-01-01

## 2022-01-01 RX ORDER — TAMSULOSIN HYDROCHLORIDE 0.4 MG/1
0.4 CAPSULE ORAL DAILY
Status: DISCONTINUED | OUTPATIENT
Start: 2022-01-01 | End: 2022-01-01 | Stop reason: HOSPADM

## 2022-01-01 RX ORDER — CYPROHEPTADINE HYDROCHLORIDE 4 MG/1
4 TABLET ORAL 3 TIMES DAILY PRN
COMMUNITY

## 2022-01-01 RX ORDER — CHOLECALCIFEROL (VITAMIN D3) 25 MCG
1000 TABLET ORAL DAILY
Status: DISCONTINUED | OUTPATIENT
Start: 2022-01-01 | End: 2022-01-01 | Stop reason: HOSPADM

## 2022-01-01 RX ORDER — POLYETHYLENE GLYCOL 3350 17 G/17G
17 POWDER, FOR SOLUTION ORAL DAILY
Status: DISCONTINUED | OUTPATIENT
Start: 2022-01-01 | End: 2022-01-01 | Stop reason: HOSPADM

## 2022-01-01 RX ORDER — CETIRIZINE HYDROCHLORIDE 10 MG/1
10 TABLET ORAL DAILY
COMMUNITY
End: 2022-01-01

## 2022-01-01 RX ORDER — DIPHENHYDRAMINE HCL 25 MG
25 CAPSULE ORAL EVERY 6 HOURS PRN
Status: DISCONTINUED | OUTPATIENT
Start: 2022-01-01 | End: 2022-01-01 | Stop reason: HOSPADM

## 2022-01-01 RX ORDER — DOCUSATE SODIUM 283 MG/5ML
1 LIQUID RECTAL DAILY PRN
Status: DISCONTINUED | OUTPATIENT
Start: 2022-01-01 | End: 2022-01-01 | Stop reason: HOSPADM

## 2022-01-01 RX ORDER — ASCORBIC ACID 500 MG
500 TABLET,CHEWABLE ORAL DAILY
Qty: 30 EACH | Refills: 0 | Status: SHIPPED | OUTPATIENT
Start: 2022-01-01

## 2022-01-01 RX ORDER — LANOLIN ALCOHOL/MO/W.PET/CERES
2500 CREAM (GRAM) TOPICAL DAILY
COMMUNITY

## 2022-01-01 RX ORDER — POLYETHYLENE GLYCOL 3350 17 G/17G
17 POWDER, FOR SOLUTION ORAL DAILY PRN
Status: DISCONTINUED | OUTPATIENT
Start: 2022-01-01 | End: 2022-01-01 | Stop reason: HOSPADM

## 2022-01-01 RX ORDER — ACETAMINOPHEN 500 MG
5000 TABLET ORAL DAILY
Status: ON HOLD | COMMUNITY
End: 2022-01-01 | Stop reason: HOSPADM

## 2022-01-01 RX ORDER — SODIUM CHLORIDE 9 MG/ML
INJECTION, SOLUTION INTRAVENOUS CONTINUOUS
Status: DISCONTINUED | OUTPATIENT
Start: 2022-01-01 | End: 2022-01-01 | Stop reason: HOSPADM

## 2022-01-01 RX ORDER — HYDROMORPHONE HYDROCHLORIDE 2 MG/ML
0.5 INJECTION, SOLUTION INTRAMUSCULAR; INTRAVENOUS; SUBCUTANEOUS
Status: DISCONTINUED | OUTPATIENT
Start: 2022-01-01 | End: 2022-01-01

## 2022-01-01 RX ORDER — DOCUSATE SODIUM 100 MG/1
100 CAPSULE, LIQUID FILLED ORAL NIGHTLY
Qty: 30 CAPSULE | Refills: 0 | Status: SHIPPED | OUTPATIENT
Start: 2022-01-01

## 2022-01-01 RX ORDER — SODIUM CHLORIDE 0.9 % (FLUSH) 0.9 %
10 SYRINGE (ML) INJECTION
Status: DISCONTINUED | OUTPATIENT
Start: 2022-01-01 | End: 2022-01-01 | Stop reason: HOSPADM

## 2022-01-01 RX ORDER — CHOLECALCIFEROL (VITAMIN D3) 25 MCG
2500 TABLET,CHEWABLE ORAL DAILY
Status: ON HOLD | COMMUNITY
End: 2022-01-01 | Stop reason: HOSPADM

## 2022-01-01 RX ORDER — PANTOPRAZOLE SODIUM 40 MG/1
40 TABLET, DELAYED RELEASE ORAL DAILY
Status: DISCONTINUED | OUTPATIENT
Start: 2022-01-01 | End: 2022-01-01 | Stop reason: HOSPADM

## 2022-01-01 RX ORDER — SODIUM CHLORIDE 9 MG/ML
1000 INJECTION, SOLUTION INTRAVENOUS
Status: COMPLETED | OUTPATIENT
Start: 2022-01-01 | End: 2022-01-01

## 2022-01-01 RX ORDER — HYDROCODONE BITARTRATE AND ACETAMINOPHEN 500; 5 MG/1; MG/1
TABLET ORAL
Status: DISCONTINUED | OUTPATIENT
Start: 2022-01-01 | End: 2022-01-01 | Stop reason: HOSPADM

## 2022-01-01 RX ORDER — TAMSULOSIN HYDROCHLORIDE 0.4 MG/1
CAPSULE ORAL DAILY
COMMUNITY

## 2022-01-01 RX ORDER — SODIUM CHLORIDE 450 MG/100ML
INJECTION, SOLUTION INTRAVENOUS CONTINUOUS
Status: DISCONTINUED | OUTPATIENT
Start: 2022-01-01 | End: 2022-01-01 | Stop reason: HOSPADM

## 2022-01-01 RX ORDER — UBIDECARENONE 75 MG
2000 CAPSULE ORAL DAILY
Status: DISCONTINUED | OUTPATIENT
Start: 2022-01-01 | End: 2022-01-01 | Stop reason: HOSPADM

## 2022-01-01 RX ORDER — LEVOFLOXACIN 5 MG/ML
750 INJECTION, SOLUTION INTRAVENOUS
Status: DISCONTINUED | OUTPATIENT
Start: 2022-01-01 | End: 2022-01-01 | Stop reason: HOSPADM

## 2022-01-01 RX ORDER — MAG HYDROX/ALUMINUM HYD/SIMETH 200-200-20
15 SUSPENSION, ORAL (FINAL DOSE FORM) ORAL EVERY 6 HOURS PRN
Status: DISCONTINUED | OUTPATIENT
Start: 2022-01-01 | End: 2022-01-01 | Stop reason: HOSPADM

## 2022-01-01 RX ORDER — FERROUS SULFATE 325(65) MG
325 TABLET, DELAYED RELEASE (ENTERIC COATED) ORAL 2 TIMES DAILY
Qty: 60 TABLET | Refills: 0 | Status: SHIPPED | OUTPATIENT
Start: 2022-01-01

## 2022-01-01 RX ORDER — IPRATROPIUM BROMIDE AND ALBUTEROL SULFATE 2.5; .5 MG/3ML; MG/3ML
3 SOLUTION RESPIRATORY (INHALATION) EVERY 6 HOURS PRN
Qty: 75 ML | Refills: 0 | OUTPATIENT
Start: 2022-01-01 | End: 2023-03-01

## 2022-01-01 RX ADMIN — CYANOCOBALAMIN TAB 500 MCG 2000 MCG: 500 TAB at 09:03

## 2022-01-01 RX ADMIN — DIPHENHYDRAMINE HYDROCHLORIDE 25 MG: 25 CAPSULE ORAL at 08:06

## 2022-01-01 RX ADMIN — PIPERACILLIN SODIUM AND TAZOBACTAM SODIUM 4.5 G: 4; .5 INJECTION, POWDER, LYOPHILIZED, FOR SOLUTION INTRAVENOUS at 07:02

## 2022-01-01 RX ADMIN — VANCOMYCIN HYDROCHLORIDE 1750 MG: 1 INJECTION, POWDER, LYOPHILIZED, FOR SOLUTION INTRAVENOUS at 10:02

## 2022-01-01 RX ADMIN — SODIUM CHLORIDE: 9 INJECTION, SOLUTION INTRAVENOUS at 10:02

## 2022-01-01 RX ADMIN — SENNOSIDES AND DOCUSATE SODIUM 1 TABLET: 50; 8.6 TABLET ORAL at 08:06

## 2022-01-01 RX ADMIN — ACETAMINOPHEN 650 MG: 325 TABLET, FILM COATED ORAL at 08:06

## 2022-01-01 RX ADMIN — SODIUM CHLORIDE 1000 ML: 9 INJECTION, SOLUTION INTRAVENOUS at 07:07

## 2022-01-01 RX ADMIN — TAMSULOSIN HYDROCHLORIDE 0.4 MG: 0.4 CAPSULE ORAL at 09:03

## 2022-01-01 RX ADMIN — LEVOFLOXACIN 750 MG: 5 INJECTION, SOLUTION INTRAVENOUS at 08:03

## 2022-01-01 RX ADMIN — THERA TABS 1 TABLET: TAB at 08:06

## 2022-01-01 RX ADMIN — SODIUM CHLORIDE: 9 INJECTION, SOLUTION INTRAVENOUS at 11:06

## 2022-01-01 RX ADMIN — FAMOTIDINE 20 MG: 20 TABLET, FILM COATED ORAL at 09:03

## 2022-01-01 RX ADMIN — SODIUM CHLORIDE 1000 ML: 0.9 INJECTION, SOLUTION INTRAVENOUS at 09:06

## 2022-01-01 RX ADMIN — PANTOPRAZOLE SODIUM 40 MG: 40 TABLET, DELAYED RELEASE ORAL at 08:06

## 2022-01-01 RX ADMIN — MELATONIN 6 MG: at 08:06

## 2022-01-01 RX ADMIN — FAMOTIDINE 20 MG: 20 TABLET, FILM COATED ORAL at 10:02

## 2022-01-01 RX ADMIN — SODIUM CHLORIDE 1000 ML: 9 INJECTION, SOLUTION INTRAVENOUS at 08:08

## 2022-01-01 RX ADMIN — SODIUM CHLORIDE 1000 ML: 9 INJECTION, SOLUTION INTRAVENOUS at 09:07

## 2022-01-01 RX ADMIN — SODIUM CHLORIDE: 4.5 INJECTION, SOLUTION INTRAVENOUS at 05:06

## 2022-01-01 RX ADMIN — Medication 1000 UNITS: at 09:03

## 2022-01-01 RX ADMIN — TAMSULOSIN HYDROCHLORIDE 0.4 MG: 0.4 CAPSULE ORAL at 08:06

## 2022-01-01 RX ADMIN — SODIUM CHLORIDE 100 ML/HR: 4.5 INJECTION, SOLUTION INTRAVENOUS at 07:06

## 2022-01-01 RX ADMIN — SODIUM CHLORIDE 1000 ML: 9 INJECTION, SOLUTION INTRAVENOUS at 11:06

## 2022-01-01 RX ADMIN — PIPERACILLIN SODIUM AND TAZOBACTAM SODIUM 4.5 G: 4; .5 INJECTION, POWDER, LYOPHILIZED, FOR SOLUTION INTRAVENOUS at 01:02

## 2022-02-27 PROBLEM — R13.10 DYSPHAGIA: Status: ACTIVE | Noted: 2022-01-01

## 2022-02-27 PROBLEM — J18.9 PNEUMONIA DUE TO INFECTIOUS ORGANISM: Status: ACTIVE | Noted: 2022-01-01

## 2022-02-27 PROBLEM — E86.0 DEHYDRATION: Status: ACTIVE | Noted: 2022-01-01

## 2022-02-27 PROBLEM — N30.00 ACUTE CYSTITIS WITHOUT HEMATURIA: Status: ACTIVE | Noted: 2022-01-01

## 2022-02-27 NOTE — ED TRIAGE NOTES
81 YO MALE TO ER FROM [facility], WHO REPORT PT IS HAVING SOB WITH LOW SAO2.  PT HAS WET COUGH.  ORIENTED TO SURROUNDINGS.  RESP REG , UNLABORED BUT SHALLOW.  MUCUS MEMBRANES ARE VERY DRY

## 2022-02-28 NOTE — HOSPITAL COURSE
02/28/2022:  Today patient reports no new complaints.  No other issues reported by nursing.  03/01/2022; Today patient  reports no complaints over night, nursing reports the same. Patient is afebrile this morning with stable labs. Patient's Xray upon admission did show that he had a lower left lobe infiltrate, we will discharge patient back to [facility] on DuoNebs as well as oral Levaquin for the next 5 days. Nursing does report that the patient has been refusing his po medications and did refuse his swallowing study by Speech Therapy yesterday. Patient will need to be reassessed by [facility]'s Speech Therapy of Occupational Therapist upon readmission to their facility.   Patient has reach [facility].

## 2022-02-28 NOTE — PROGRESS NOTES
Pharmacy consulted to dose and follow vancomycin for HAP in this 83 y/o male patient. Phone conversation with Sanchez Alex NP.    Will initiate Vancomycin 1750mg every 24 hours and check a trough before the 4th dose.    Pharmacy will continue to monitor and make adjustments as needed.    Thank you for the consult,  Mariia Srivastava, PharmD  [phone]

## 2022-02-28 NOTE — PLAN OF CARE
Problem: Occupational Therapy Goal  Goal: Occupational Therapy Goal  Description: Goals to be met by: Discharge     Patient will increase functional independence with ADLs by performing:    Grooming while seated with Set-up Assistance.  Sitting at edge of bed x20 minutes with Supervision.  Toilet transfer to bedside commode with Contact Guard Assistance.  Increased functional strength to 4/5 for increased safety and independence with self care and mobility tasks.    Outcome: Ongoing, Progressing

## 2022-02-28 NOTE — NURSING
Received pt from the ER for PNA. Will receive antibiotics. Pt is alert but very [place]. Resp. Unlabored. O2@2L NC. Dry hacky cough noted. Pt repositioned in bed. Call light near. Will continue to monitor.

## 2022-02-28 NOTE — H&P
[facility] Medical Surgical Unit  [facility] Medicine  History & Physical    Patient Name: Roman Norman  MRN: [id number]  Patient Class: IP- Inpatient  Admission Date: 2/27/2022  Attending Physician: Vinicius Horne DO  Primary Care Provider: Wiliam Torres MD         Patient information was obtained from nursing home and ER records.     Subjective:     Principal Problem:Pneumonia due to infectious organism    Chief Complaint:   Chief Complaint   Patient presents with    Shortness of Breath    Weakness    Pneumonia        HPI: Mr. Roman Norman is an 81 y/o WM who is a resident at [facility] with past medical history of CVA, dysphagia related to CVA, [place], and COVID (1.5 weeks ago) sent to the ED by EMS for hypoxia, cough and lethargic. [facility] nursing staff reports patient found to have O2 sat 88% which improved to 93% on O2 at 5L/NC, increase in cough and coarse breath sounds. Patient was found to have a LLL pneumonia and UTI during his ED course.       Past Medical History:   Diagnosis Date    Renal disorder     Stroke        History reviewed. No pertinent surgical history.    Review of patient's allergies indicates:  No Known Allergies    No current facility-administered medications on file prior to encounter.     Current Outpatient Medications on File Prior to Encounter   Medication Sig    cetirizine (ZYRTEC) 10 MG tablet Take 10 mg by mouth once daily.    cyanocobalamin 2000 MCG tablet Take 2,000 mcg by mouth once daily.    tamsulosin (FLOMAX) 0.4 mg Cap Take by mouth once daily.    vitamin D (VITAMIN D3) 1000 units Tab Take 1,000 Units by mouth once daily.     Family History    None       Tobacco Use    Smoking status: Never Smoker    Smokeless tobacco: Never Used   Substance and Sexual Activity    Alcohol use: Never    Drug use: Not on file    Sexual activity: Not Currently     Review of Systems   Constitutional:  Positive for activity change (decreased activity since COVID dx  02/11/2022) and appetite change (Decreased appetitie since COVID diagnosis 02/11/2022).   HENT:  Positive for hearing loss and trouble swallowing (Dysphagia s/p CVA in December 2021).    Eyes: Negative.    Respiratory:  Positive for cough.    Cardiovascular: Negative.    Gastrointestinal: Negative.    Endocrine: Negative.    Genitourinary: Negative.    Musculoskeletal: Negative.    Skin: Negative.    Allergic/Immunologic: Negative.    Neurological:  Positive for weakness (Generalized.).   Hematological: Negative.    Psychiatric/Behavioral: Negative.     Objective:     Vital Signs (Most Recent):  Temp: 99.1 °F (37.3 °C) (02/27/22 1757)  Pulse: 78 (02/27/22 1939)  Resp: 18 (02/27/22 1939)  BP: 117/67 (02/27/22 1939)  SpO2: 95 % (02/27/22 1939) Vital Signs (24h Range):  Temp:  [99.1 °F (37.3 °C)] 99.1 °F (37.3 °C)  Pulse:  [71-83] 78  Resp:  [16-18] 18  SpO2:  [94 %-95 %] 95 %  BP: ()/(54-67) 117/67     Weight: 79.8 kg (176 lb)  Body mass index is 25.25 kg/m².    Physical Exam  Vitals and nursing note reviewed.   Constitutional:       Appearance: He is ill-appearing.   HENT:      Head: Normocephalic and atraumatic.      Right Ear: There is impacted cerumen (Cerumen impaction, partially removed per NP).      Left Ear: Tympanic membrane normal.      Mouth/Throat:      Mouth: Mucous membranes are dry.   Eyes:      Conjunctiva/sclera: Conjunctivae normal.   Cardiovascular:      Rate and Rhythm: Normal rate and regular rhythm.      Pulses: Normal pulses.      Heart sounds: Normal heart sounds.   Pulmonary:      Effort: Pulmonary effort is normal.      Breath sounds: Normal breath sounds.   Abdominal:      General: Abdomen is flat. Bowel sounds are normal.   Musculoskeletal:      Cervical back: Normal range of motion and neck supple.      Comments: Generalized weakness that has become progressively worse per [facility] staff over the past 16 days (COVID dx on 02/11/2022)   Skin:     General: Skin is warm and dry.       Capillary Refill: Capillary refill takes 2 to 3 seconds.   Neurological:      Mental Status: Mental status is at baseline.   Psychiatric:         Mood and Affect: Mood normal.         Behavior: Behavior normal.           Significant Labs: All pertinent labs within the past 24 hours have been reviewed.  CBC:   Recent Labs   Lab 02/27/22  1730   WBC 4.63   HGB 10.6*   HCT 29.1*        CMP:   Recent Labs   Lab 02/27/22  1730   *   K 3.8      CO2 32   GLU 80   BUN 25*   CREATININE 0.83   CALCIUM 9.8   PROT 7.7   ALBUMIN 2.2*   BILITOT 0.7   ALKPHOS 73   AST 17   ALT 10*   ANIONGAP 13   EGFRNONAA 95     Cardiac Markers: No results for input(s): CKMB, MYOGLOBIN, BNP, TROPISTAT in the last 48 hours.  Lactic Acid:   Recent Labs   Lab 02/27/22  1732   LACTATE 1.0     Urine Studies:   Recent Labs   Lab 02/27/22  1806   COLORU Yellow   APPEARANCEUA Cloudy*   PHUR 6.0   SPECGRAV >=1.030*   PROTEINUA Trace*   GLUCUA Negative   KETONESU 15 *   BILIRUBINUA Moderate*   OCCULTUA Negative   NITRITE Positive*   UROBILINOGEN 0.2   LEUKOCYTESUR Negative   RBCUA 5-10*   WBCUA 0-5   BACTERIA Few*       Significant Imaging: I have reviewed all pertinent imaging results/findings within the past 24 hours.  CXR: I have reviewed all pertinent results/findings within the past 24 hours and my personal findings are:  Patchy Left basilar densities concerning for infectious/inflammatory processes  per Radiologist.    Assessment/Plan:     No notes have been filed under this hospital service.  Service: Hospital Medicine    VTE Risk Mitigation (From admission, onward)         Ordered     Place EMMANUEL hose  Until discontinued         02/27/22 2051     Reason for No Pharmacological VTE Prophylaxis  Once        Question:  Reasons:  Answer:  Risk of Bleeding    02/27/22 2051     IP VTE HIGH RISK PATIENT  Once         02/27/22 2051     Place sequential compression device  Until discontinued         02/27/22 2051               I spoke with  Dr. Paulina Beltrán, Hospitalist at [facility]. MD agrees with evaluation and treatment plan. Medication reconciliation also completed with Dr. Beltrán at this time.     Kang Alex NP-C  Department of Hospital Medicine   [place] - Medical Surgical Unit

## 2022-02-28 NOTE — PLAN OF CARE
[facility] - Medical Surgical Unit  Initial Discharge Assessment       Primary Care Provider: Wiliam Torres MD    Admission Diagnosis: FRANCISCO (acute kidney injury) [N17.9]  Hypotension [I95.9]  Pneumonia of left lower lobe due to infectious organism [J18.9]    Admission Date: 2/27/2022  Expected Discharge Date:     Discharge Barriers Identified: None    Payor: MEDICARE / Plan: MEDICARE PART A & B / Product Type: Government /     No emergency contact information on file.    Discharge Plan A: Return to nursing home  Discharge Plan B: Return to Nursing Home    No Pharmacies Listed    Initial Assessment (most recent)     Adult Discharge Assessment - 02/28/22 1315        Discharge Assessment    Assessment Type Discharge Planning Assessment     Lives With --   [facility]    Discharge Plan A Return to nursing home     Discharge Plan B Return to Nursing Home     DME Needed Upon Discharge  none     Discharge Barriers Identified None             Pt transferred from [facility]. SS spoke with Jorge at [facility] and Pt will return to [facility] at [place]. SS following.

## 2022-02-28 NOTE — PROGRESS NOTES
[facility] Medical Surgical [facility] Medicine  Progress Note    Patient Name: Roman Norman  MRN: [id number]  Patient Class: IP- Inpatient   Admission Date: 2/27/2022  Length of Stay: 1 days  Attending Physician: Vinicius Horne DO  Primary Care Provider: Wiliam Torres MD        Subjective:     Principal Problem:Pneumonia due to infectious organism        HPI:  Mr. Roman Norman is an 81 y/o WM who is a resident at [facility] with past medical history of CVA, dysphagia related to CVA, [place], and COVID (1.5 weeks ago) sent to the ED by EMS for hypoxia, cough and lethargic. [place] nursing staff reports patient found to have O2 sat 88% which improved to 93% on O2 at 5L/NC, increase in cough and coarse breath sounds. Patient was found to have a LLL pneumonia and UTI during his ED course.       Overview/Hospital Course:  02/28/2022:  Today patient reports no new complaints.  No other issues reported by nursing.      Interval History: Today patient reports no new complaints.  No other issues reported by nursing.    Review of Systems   Constitutional:  Positive for activity change (decreased activity since COVID dx 02/11/2022) and appetite change (Decreased appetitie since COVID diagnosis 02/11/2022).   HENT:  Negative for sore throat. Trouble swallowing: Dysphagia s/p CVA in December 2021.   Eyes: Negative.    Respiratory:  Positive for cough.    Cardiovascular: Negative.  Negative for chest pain.   Gastrointestinal: Negative.  Negative for abdominal pain.   Endocrine: Negative.    Genitourinary: Negative.    Musculoskeletal: Negative.    Skin: Negative.    Allergic/Immunologic: Negative.    Neurological:  Positive for weakness (Generalized.).   Hematological: Negative.    Psychiatric/Behavioral: Negative.     Objective:     Vital Signs (Most Recent):  Temp: 98.3 °F (36.8 °C) (02/28/22 0800)  Pulse: 73 (02/28/22 0800)  Resp: 17 (02/28/22 0800)  BP: (!) 102/55 (02/28/22 0800)  SpO2: 97 % (02/28/22 0800)    Vital Signs (24h Range):  Temp:  [97.5 °F (36.4 °C)-99.1 °F (37.3 °C)] 98.3 °F (36.8 °C)  Pulse:  [71-83] 73  Resp:  [16-22] 17  SpO2:  [94 %-100 %] 97 %  BP: ()/(46-67) 102/55     Weight: 67.6 kg (149 lb)  Body mass index is 21.38 kg/m².    Intake/Output Summary (Last 24 hours) at 2/28/2022 0921  Last data filed at 2/28/2022 0600  Gross per 24 hour   Intake 900 ml   Output --   Net 900 ml      Physical Exam  Vitals and nursing note reviewed.   Constitutional:       General: He is not in acute distress.     Appearance: He is ill-appearing. He is not toxic-appearing or diaphoretic.   HENT:      Head: Normocephalic and atraumatic.      Right Ear: Impacted cerumen: Cerumen impaction, partially removed per NP.   Eyes:      General: No scleral icterus.        Right eye: No discharge.         Left eye: No discharge.      Conjunctiva/sclera: Conjunctivae normal.   Cardiovascular:      Rate and Rhythm: Normal rate and regular rhythm.      Heart sounds: Normal heart sounds. No murmur heard.    No friction rub. No gallop.   Pulmonary:      Effort: Pulmonary effort is normal. No respiratory distress.      Breath sounds: Normal breath sounds. No stridor. No wheezing, rhonchi or rales.   Chest:      Chest wall: No tenderness.   Abdominal:      General: Abdomen is flat. Bowel sounds are normal. There is no distension.      Palpations: Abdomen is soft. There is no mass.      Tenderness: There is no abdominal tenderness. There is no guarding or rebound.      Hernia: No hernia is present.   Musculoskeletal:      Comments: Generalized weakness that has become progressively worse per [facility] staff over the past 16 days (COVID dx on 02/11/2022)   Skin:     General: Skin is warm and dry.      Coloration: Skin is not jaundiced or pale.      Findings: No bruising, erythema, lesion or rash.   Neurological:      Mental Status: Mental status is at baseline.   Psychiatric:         Mood and Affect: Mood normal.         Behavior:  Behavior normal.       Significant Labs: All pertinent labs within the past 24 hours have been reviewed.  BMP:   Recent Labs   Lab 02/28/22  0439   *      K 3.6      CO2 30   BUN 25*   CREATININE 0.90   CALCIUM 8.7     CBC:   Recent Labs   Lab 02/27/22  1730 02/28/22  0439   WBC 4.63 5.58   HGB 10.6* 9.0*   HCT 29.1* 25.7*    266       Significant Imaging: I have reviewed all pertinent imaging results/findings within the past 24 hours.      Assessment/Plan:      * Pneumonia due to infectious organism    I will deescalate the patient's antibiotics to Levaquin 750 mg IV q.day.  patient was year total of 5 days of IV antibiotic therapy.  I will consider swing bed if the patient continues to improve appropriately.      VTE Risk Mitigation (From admission, onward)         Ordered     Place EMMANUEL hose  Until discontinued         02/27/22 2051     Reason for No Pharmacological VTE Prophylaxis  Once        Question:  Reasons:  Answer:  Risk of Bleeding    02/27/22 2051     IP VTE HIGH RISK PATIENT  Once         02/27/22 2051     Place sequential compression device  Until discontinued         02/27/22 2051                Discharge Planning   SUSAN:      Code Status: Full Code   Is the patient medically ready for discharge?:     Reason for patient still in hospital (select all that apply): Treatment                     Vinicius Horne DO  Department of Hospital Medicine   [facility] - Medical Surgical Unit

## 2022-02-28 NOTE — PT/OT/SLP EVAL
Physical Therapy Evaluation    Patient Name:  Roman Norman   MRN:  [id number]    Recommendations:     Discharge Recommendations:  nursing facility, skilled   Discharge Equipment Recommendations: walker, rolling, bedside commode, shower chair   Barriers to discharge: None    Assessment:     Roman Norman is a 82 y.o. male admitted with a medical diagnosis of Pneumonia due to infectious organism.  He presents with the following impairments/functional limitations:  weakness, gait instability, impaired endurance, impaired sensation, impaired functional mobilty, decreased coordination, decreased safety awareness, impaired balance, decreased lower extremity function.    Rehab Prognosis: Good; patient would benefit from acute skilled PT services to address these deficits and reach maximum level of function.    Recent Surgery: * No surgery found *      Plan:     During this hospitalization, patient to be seen 5 x/week to address the identified rehab impairments via gait training, therapeutic activities, therapeutic exercises, neuromuscular re-education and progress toward the following goals:    · Plan of Care Expires:       Subjective     Chief Complaint: Patient without complaints upon therapist arrival and is agreeable to tx today.  Patient/Family Comments/goals:   Pain/Comfort:  · Pain Rating 1: 0/10    Patients cultural, spiritual, Bahai conflicts given the current situation: no    Living Environment:  Patient resides at a local nursing home   Equipment used at home:  .  DME owned (not currently used): none.  Upon discharge, patient will have assistance from family.    Objective:     Communicated with nursing staff prior to session.  Patient found supine with peripheral IV, oxygen  upon PT entry to room.    General Precautions: Standard, fall   Orthopedic Precautions:N/A   Braces: N/A  Respiratory Status: Nasal cannula, flow 2 L/min    Exams:  · Cognitive Exam:  Patient is oriented to Person, Place, Time and  Situation  · RLE ROM: WFL  · RLE Strength: Deficits: 3/5 gross  · LLE ROM: WFL  · LLE Strength: Deficits: 3/5 gross    Functional Mobility:  · Bed Mobility:     · Rolling Left:  moderate assistance  · Rolling Right: moderate assistance  · Supine to Sit: moderate assistance  · Transfers:     · Sit to Stand:  moderate assistance with rolling walker  · Bed to Chair: maximal assistance with  rolling walker  using  Stand Pivot and Step Transfer  · Gait: Unable      AM-PAC 6 CLICK MOBILITY  Total Score:11     Patient left supine with call button in reach.    GOALS:   1. Patient will transfer sit<>stand with CGA and transfer from bed<>chair with CGA using front wheeled walker.   2. Patient will ambulate 35 feet with use of standard cane for ambulation within his living space with min A.  3. Patient will increase LE gross MMT to 3+/5    History:     Past Medical History:   Diagnosis Date    Chronic kidney disease, unspecified     COVID     Diabetes mellitus     Dysphagia following cerebrovascular accident     Falls frequently     Muscle weakness     Renal disorder     Stroke        History reviewed. No pertinent surgical history.    Time Tracking:     PT Received On: 02/28/22  PT Start Time: 1400     PT Stop Time: 1425  PT Total Time (min): 25 min     Billable Minutes: Evaluation 25 02/28/2022

## 2022-02-28 NOTE — PLAN OF CARE
Problem: Adult Inpatient Plan of Care  Goal: Plan of Care Review  Outcome: Ongoing, Progressing  Goal: Patient-Specific Goal (Individualized)  Outcome: Ongoing, Progressing  Goal: Absence of Hospital-Acquired Illness or Injury  Outcome: Ongoing, Progressing  Goal: Optimal Comfort and Wellbeing  Outcome: Ongoing, Progressing     Problem: Fluid Imbalance (Pneumonia)  Goal: Fluid Balance  Outcome: Ongoing, Progressing     Problem: Infection (Pneumonia)  Goal: Resolution of Infection Signs and Symptoms  Outcome: Ongoing, Progressing     Problem: Respiratory Compromise (Pneumonia)  Goal: Effective Oxygenation and Ventilation  Outcome: Ongoing, Progressing     Problem: Fall Injury Risk  Goal: Absence of Fall and Fall-Related Injury  Outcome: Ongoing, Progressing     Problem: Skin Injury Risk Increased  Goal: Skin Health and Integrity  Outcome: Ongoing, Progressing     Problem: Impaired Wound Healing  Goal: Optimal Wound Healing  Outcome: Ongoing, Progressing

## 2022-02-28 NOTE — PT/OT/SLP EVAL
Occupational Therapy   Evaluation    Name: Roman Norman  MRN: [id number]  Admitting Diagnosis:  Pneumonia due to infectious organism  Recent Surgery: * No surgery found *      Recommendations:     Discharge Recommendations: nursing facility, skilled  Discharge Equipment Recommendations:  walker, standard, walker, rolling, bedside commode, shower chair  Barriers to discharge:       Assessment:     Roman Norman is a 82 y.o. male with a medical diagnosis of Pneumonia due to infectious organism.  He presents with generalized muscle weakness and a decline in functional abilities. Performance deficits affecting function: weakness, impaired endurance, impaired self care skills, impaired functional mobilty.      Rehab Prognosis: Good and Fair; patient would benefit from acute skilled OT services to address these deficits and reach maximum level of function.       Plan:     Patient to be seen 3 x/week to address the above listed problems via self-care/home management, therapeutic activities, therapeutic exercises, neuromuscular re-education  · Plan of Care Expires:    · Plan of Care Reviewed with: patient    Subjective     Chief Complaint: decreased functional independence  Patient/Family Comments/goals: increase functional abilities    Occupational Profile:  Living Environment: was at SNF prior to admission  Previous level of function: Required assistance with ADLs  Equipment Used at Home: unknown to this writer; Pt is a poor historian  Assistance upon Discharge: Patient to discharge back to SNF where assistance will be provided as needed by staff.     Pain/Comfort:  · Pain Rating 1: 0/10    Patients cultural, spiritual, Episcopal conflicts given the current situation: no    Objective:     Communicated with: nurse prior to session.  Patient found right sidelying with peripheral IV, oxygen upon OT entry to room.    General Precautions: Standard, fall   Orthopedic Precautions:N/A   Braces: N/A  Respiratory Status: Nasal  cannula, flow 2 L/min    Occupational Performance:    Bed Mobility:    · Patient completed Rolling/Turning to Left with  minimum assistance and moderate assistance  · Patient completed Rolling/Turning to Right with minimum assistance and moderate assistance  · Patient completed Scooting/Bridging with minimum assistance and moderate assistance  · Patient completed Supine to Sit with minimum assistance and moderate assistance  · Patient completed Sit to Supine with moderate assistance    Functional Mobility/Transfers:  · Patient completed Sit <> Stand Transfer with minimum assistance and moderate assistance  with  hand-held assist and rolling walker   · Functional Mobility: Patient stood from EOB; Pt facilitated in side stepping to HOB    Activities of Daily Living:  · Feeding:  supervision and stand by assistance /setup  · Grooming: minimum assistance .  · Bathing: maximal assistance and total assistance .  · Upper Body Dressing: moderate assistance .  · Lower Body Dressing: maximal assistance and total assistance .  · Toileting: maximal assistance and total assistance .    Cognitive/Visual Perceptual:  Cognitive/Psychosocial Skills:     -       Oriented to: Person   -       Follows Commands/attention:Follows one-step commands  -       Communication: [place]  -       Memory: Poor immediate recall  -       Safety awareness/insight to disability: impaired   -       Mood/Affect/Coping skills/emotional control: Appropriate to situation    Physical Exam:  Upper Extremity Range of Motion:  -       Right Upper Extremity: WFL  -       Left Upper Extremity: WFL  Upper Extremity Strength: -       Right Upper Extremity: 3/5  -       Left Upper Extremity: 3/5   Strength: -       Right Upper Extremity: 3/5  -       Left Upper Extremity: 3/5  Gross motor coordination:   impaired due to generalized muscle weakness    AMPAC 6 Click ADL:  AMPAC Total Score: 13    Treatment & Education: Supervisory visit conducted with Lacie NUÑEZ  Rashawn re:POC and goals.   OTs role and POC discussed.  Education:    Patient left right sidelying with all lines intact and call button in reach    GOALS:   Multidisciplinary Problems     Occupational Therapy Goals        Problem: Occupational Therapy Goal    Goal Priority Disciplines Outcome Interventions   Occupational Therapy Goal     OT, PT/OT Ongoing, Progressing    Description: Goals to be met by: Discharge     Patient will increase functional independence with ADLs by performing:    Grooming while seated with Set-up Assistance.  Sitting at edge of bed x20 minutes with Supervision.  Toilet transfer to bedside commode with Contact Guard Assistance.  Increased functional strength to 4/5 for increased safety and independence with self care and mobility tasks.                     History:     Past Medical History:   Diagnosis Date    Chronic kidney disease, unspecified     COVID     Diabetes mellitus     Dysphagia following cerebrovascular accident     Falls frequently     Muscle weakness     Renal disorder     Stroke        History reviewed. No pertinent surgical history.    Time Tracking:     OT Date of Treatment: 02/28/22  OT Start Time: 1320  OT Stop Time: 1340  OT Total Time (min): 20 min    Billable Minutes:Evaluation low complexity    2/28/2022

## 2022-02-28 NOTE — SUBJECTIVE & OBJECTIVE
Interval History: Today patient reports no new complaints.  No other issues reported by nursing.    Review of Systems   Constitutional:  Positive for activity change (decreased activity since COVID dx 02/11/2022) and appetite change (Decreased appetitie since COVID diagnosis 02/11/2022).   HENT:  Negative for sore throat. Trouble swallowing: Dysphagia s/p CVA in December 2021.   Eyes: Negative.    Respiratory:  Positive for cough.    Cardiovascular: Negative.  Negative for chest pain.   Gastrointestinal: Negative.  Negative for abdominal pain.   Endocrine: Negative.    Genitourinary: Negative.    Musculoskeletal: Negative.    Skin: Negative.    Allergic/Immunologic: Negative.    Neurological:  Positive for weakness (Generalized.).   Hematological: Negative.    Psychiatric/Behavioral: Negative.     Objective:     Vital Signs (Most Recent):  Temp: 98.3 °F (36.8 °C) (02/28/22 0800)  Pulse: 73 (02/28/22 0800)  Resp: 17 (02/28/22 0800)  BP: (!) 102/55 (02/28/22 0800)  SpO2: 97 % (02/28/22 0800)   Vital Signs (24h Range):  Temp:  [97.5 °F (36.4 °C)-99.1 °F (37.3 °C)] 98.3 °F (36.8 °C)  Pulse:  [71-83] 73  Resp:  [16-22] 17  SpO2:  [94 %-100 %] 97 %  BP: ()/(46-67) 102/55     Weight: 67.6 kg (149 lb)  Body mass index is 21.38 kg/m².    Intake/Output Summary (Last 24 hours) at 2/28/2022 0921  Last data filed at 2/28/2022 0600  Gross per 24 hour   Intake 900 ml   Output --   Net 900 ml      Physical Exam  Vitals and nursing note reviewed.   Constitutional:       General: He is not in acute distress.     Appearance: He is ill-appearing. He is not toxic-appearing or diaphoretic.   HENT:      Head: Normocephalic and atraumatic.      Right Ear: Impacted cerumen: Cerumen impaction, partially removed per NP.   Eyes:      General: No scleral icterus.        Right eye: No discharge.         Left eye: No discharge.      Conjunctiva/sclera: Conjunctivae normal.   Cardiovascular:      Rate and Rhythm: Normal rate and regular  rhythm.      Heart sounds: Normal heart sounds. No murmur heard.    No friction rub. No gallop.   Pulmonary:      Effort: Pulmonary effort is normal. No respiratory distress.      Breath sounds: Normal breath sounds. No stridor. No wheezing, rhonchi or rales.   Chest:      Chest wall: No tenderness.   Abdominal:      General: Abdomen is flat. Bowel sounds are normal. There is no distension.      Palpations: Abdomen is soft. There is no mass.      Tenderness: There is no abdominal tenderness. There is no guarding or rebound.      Hernia: No hernia is present.   Musculoskeletal:      Comments: Generalized weakness that has become progressively worse per [facility] staff over the past 16 days (COVID dx on 02/11/2022)   Skin:     General: Skin is warm and dry.      Coloration: Skin is not jaundiced or pale.      Findings: No bruising, erythema, lesion or rash.   Neurological:      Mental Status: Mental status is at baseline.   Psychiatric:         Mood and Affect: Mood normal.         Behavior: Behavior normal.       Significant Labs: All pertinent labs within the past 24 hours have been reviewed.  BMP:   Recent Labs   Lab 02/28/22  0439   *      K 3.6      CO2 30   BUN 25*   CREATININE 0.90   CALCIUM 8.7     CBC:   Recent Labs   Lab 02/27/22  1730 02/28/22  0439   WBC 4.63 5.58   HGB 10.6* 9.0*   HCT 29.1* 25.7*    266       Significant Imaging: I have reviewed all pertinent imaging results/findings within the past 24 hours.

## 2022-02-28 NOTE — ASSESSMENT & PLAN NOTE
I will deescalate the patient's antibiotics to Levaquin 750 mg IV q.day.  patient was year total of 5 days of IV antibiotic therapy.  I will consider swing bed if the patient continues to improve appropriately.

## 2022-02-28 NOTE — SUBJECTIVE & OBJECTIVE
Past Medical History:   Diagnosis Date    Renal disorder     Stroke        History reviewed. No pertinent surgical history.    Review of patient's allergies indicates:  No Known Allergies    No current facility-administered medications on file prior to encounter.     Current Outpatient Medications on File Prior to Encounter   Medication Sig    cetirizine (ZYRTEC) 10 MG tablet Take 10 mg by mouth once daily.    cyanocobalamin 2000 MCG tablet Take 2,000 mcg by mouth once daily.    tamsulosin (FLOMAX) 0.4 mg Cap Take by mouth once daily.    vitamin D (VITAMIN D3) 1000 units Tab Take 1,000 Units by mouth once daily.     Family History    None       Tobacco Use    Smoking status: Never Smoker    Smokeless tobacco: Never Used   Substance and Sexual Activity    Alcohol use: Never    Drug use: Not on file    Sexual activity: Not Currently     Review of Systems   Constitutional:  Positive for activity change (decreased activity since COVID dx 02/11/2022) and appetite change (Decreased appetitie since COVID diagnosis 02/11/2022).   HENT:  Positive for hearing loss and trouble swallowing (Dysphagia s/p CVA in December 2021).    Eyes: Negative.    Respiratory:  Positive for cough.    Cardiovascular: Negative.    Gastrointestinal: Negative.    Endocrine: Negative.    Genitourinary: Negative.    Musculoskeletal: Negative.    Skin: Negative.    Allergic/Immunologic: Negative.    Neurological:  Positive for weakness (Generalized.).   Hematological: Negative.    Psychiatric/Behavioral: Negative.     Objective:     Vital Signs (Most Recent):  Temp: 99.1 °F (37.3 °C) (02/27/22 1757)  Pulse: 78 (02/27/22 1939)  Resp: 18 (02/27/22 1939)  BP: 117/67 (02/27/22 1939)  SpO2: 95 % (02/27/22 1939) Vital Signs (24h Range):  Temp:  [99.1 °F (37.3 °C)] 99.1 °F (37.3 °C)  Pulse:  [71-83] 78  Resp:  [16-18] 18  SpO2:  [94 %-95 %] 95 %  BP: ()/(54-67) 117/67     Weight: 79.8 kg (176 lb)  Body mass index is 25.25 kg/m².    Physical  Exam  Vitals and nursing note reviewed.   Constitutional:       Appearance: He is ill-appearing.   HENT:      Head: Normocephalic and atraumatic.      Right Ear: There is impacted cerumen (Cerumen impaction, partially removed per NP).      Left Ear: Tympanic membrane normal.      Mouth/Throat:      Mouth: Mucous membranes are dry.   Eyes:      Conjunctiva/sclera: Conjunctivae normal.   Cardiovascular:      Rate and Rhythm: Normal rate and regular rhythm.      Pulses: Normal pulses.      Heart sounds: Normal heart sounds.   Pulmonary:      Effort: Pulmonary effort is normal.      Breath sounds: Normal breath sounds.   Abdominal:      General: Abdomen is flat. Bowel sounds are normal.   Musculoskeletal:      Cervical back: Normal range of motion and neck supple.      Comments: Generalized weakness that has become progressively worse per [facility] staff over the past 16 days (COVID dx on 02/11/2022)   Skin:     General: Skin is warm and dry.      Capillary Refill: Capillary refill takes 2 to 3 seconds.   Neurological:      Mental Status: Mental status is at baseline.   Psychiatric:         Mood and Affect: Mood normal.         Behavior: Behavior normal.           Significant Labs: All pertinent labs within the past 24 hours have been reviewed.  CBC:   Recent Labs   Lab 02/27/22  1730   WBC 4.63   HGB 10.6*   HCT 29.1*        CMP:   Recent Labs   Lab 02/27/22  1730   *   K 3.8      CO2 32   GLU 80   BUN 25*   CREATININE 0.83   CALCIUM 9.8   PROT 7.7   ALBUMIN 2.2*   BILITOT 0.7   ALKPHOS 73   AST 17   ALT 10*   ANIONGAP 13   EGFRNONAA 95     Cardiac Markers: No results for input(s): CKMB, MYOGLOBIN, BNP, TROPISTAT in the last 48 hours.  Lactic Acid:   Recent Labs   Lab 02/27/22  1732   LACTATE 1.0     Urine Studies:   Recent Labs   Lab 02/27/22  1806   COLORU Yellow   APPEARANCEUA Cloudy*   PHUR 6.0   SPECGRAV >=1.030*   PROTEINUA Trace*   GLUCUA Negative   KETONESU 15 *   BILIRUBINUA Moderate*    OCCULTUA Negative   NITRITE Positive*   UROBILINOGEN 0.2   LEUKOCYTESUR Negative   RBCUA 5-10*   WBCUA 0-5   BACTERIA Few*       Significant Imaging: I have reviewed all pertinent imaging results/findings within the past 24 hours.  CXR: I have reviewed all pertinent results/findings within the past 24 hours and my personal findings are:  Patchy Left basilar densities concerning for infectious/inflammatory processes  per Radiologist.

## 2022-02-28 NOTE — HPI
Mr. Roman Norman is an 81 y/o WM who is a resident at [facility] with past medical history of CVA, dysphagia related to CVA, [place], and COVID (1.5 weeks ago) sent to the ED by EMS for hypoxia, cough and lethargic. [place] nursing staff reports patient found to have O2 sat 88% which improved to 93% on O2 at 5L/NC, increase in cough and coarse breath sounds. Patient was found to have a LLL pneumonia and UTI during his ED course.

## 2022-02-28 NOTE — PT/OT/SLP EVAL
Speech Language Pathology Evaluation  Cognitive/Bedside Swallow    Patient Name:  Roman Norman   MRN:  [id number]  Admitting Diagnosis: Pneumonia due to infectious organism        Plan:     ·   ST unsuccessful in completion of evaluation, pt with eyes open at times to ST verbalizations, mumbled words x2-3 questions with 0% intelligibility. Tactile and verbal cues for participation in eval with no success   · SLP Follow-Up:     3/1/22 for full evaluation upon increased alertness       Time Tracking:       Minutes: Total Time 12 minutes, no charge       Marybel Solomon M.S CCC-SLP  02/28/2022

## 2022-03-01 PROBLEM — E86.0 DEHYDRATION: Status: RESOLVED | Noted: 2022-01-01 | Resolved: 2022-01-01

## 2022-03-01 NOTE — PT/OT/SLP EVAL
Speech Language Pathology Evaluation  Bedside Swallow    Patient Name:  Roman Norman   MRN:  [id number]  Admitting Diagnosis: Pneumonia due to infectious organism    Recommendations:                 General Recommendations:  Follow-up not indicated Pt discharged to SNF this date  Diet recommendations:  Mechanical soft, Chopped meat, Thin   Aspiration Precautions: 1 bite/sip at a time, Alternating bites/sips, Assistance with meals and Eliminate distractions   General Precautions: Standard, fall, aspiration  Communication strategies:  Max difficulty with vision and hearing    History:     Past Medical History:   Diagnosis Date    Chronic kidney disease, unspecified     COVID     Diabetes mellitus     Dysphagia following cerebrovascular accident     Falls frequently     Muscle weakness     Renal disorder     Stroke        History reviewed. No pertinent surgical history.    Social History: Patient lives at [facility] where staff assists with needs.     Prior diet: regular diet, thin liquids.    Subjective     Pt increased alertness this date. Max cues to follow secondary to decreased vision and hearing.     Respiratory Status: Room air    Objective:     Oral Musculature Evaluation  · Oral Musculature: general weakness  · Dentition: edentulous  · Secretion Management: adequate  · Mucosal Quality: good  · Mandibular Strength and Mobility: WNL  · Oral Labial Strength and Mobility: WNL  · Lingual Strength and Mobility: WNL  · Velar Elevation: WNL  · Buccal Strength and Mobility: WNL    Bedside Swallow Eval:   Consistencies Assessed:  · Thin liquids x4  · Soft solids x6     Oral Phase:   · WFL    Pharyngeal Phase:   · no overt clinical signs/symptoms of aspiration    Compensatory Strategies  · None      Assessment:     Roman Norman is a 82 y.o. male with an SLP diagnosis of Dysphagia.  He presents with difficulty following directions due to hearing and vision loss, pt with decreased alertness due to recent  onset sickness. Upon increased understanding and alertness pt with toleration of liquids, MCS solids (banana), with no overt s/sx aspiration. .      Plan:     ·   · Plan of Care expires:  03/01/22  · Plan of Care reviewed with:  patient   · SLP Follow-Up:  No (dc back to SNF this cadence)       Discharge recommendations:  nursing facility, skilled   Barriers to Discharge:  None    Time Tracking:     SLP Treatment Date:      Speech Start Time:  0815  Speech Stop Time:  0835     Speech Total Time (min):  20 min    Billable Minutes: Eval Swallow and Oral Function 20 03/01/2022

## 2022-03-01 NOTE — PLAN OF CARE
Problem: Adult Inpatient Plan of Care  Goal: Plan of Care Review  Outcome: Met  Goal: Patient-Specific Goal (Individualized)  Outcome: Met  Goal: Absence of Hospital-Acquired Illness or Injury  Outcome: Met  Goal: Optimal Comfort and Wellbeing  Outcome: Met  Goal: Readiness for Transition of Care  Outcome: Met     Problem: Fluid Imbalance (Pneumonia)  Goal: Fluid Balance  Outcome: Met     Problem: Infection (Pneumonia)  Goal: Resolution of Infection Signs and Symptoms  Outcome: Met     Problem: Respiratory Compromise (Pneumonia)  Goal: Effective Oxygenation and Ventilation  Outcome: Met     Problem: Fall Injury Risk  Goal: Absence of Fall and Fall-Related Injury  Outcome: Met     Problem: Skin Injury Risk Increased  Goal: Skin Health and Integrity  Outcome: Met     Problem: Impaired Wound Healing  Goal: Optimal Wound Healing  Outcome: Met

## 2022-03-01 NOTE — RESPIRATORY THERAPY
Fund patient on Room Air with SATs=90%. Patient was placed back onto NC 2, SATs increased to 96%.  HR 73  RR 16 and shallow. Patient is a mouth breather and hard of hearing as well.

## 2022-03-01 NOTE — DISCHARGE SUMMARY
[facility] Medical Surgical Unit  Hospital Medicine  Discharge Summary      Patient Name: Roman Norman  MRN: [id number]  Patient Class: IP- Inpatient  Admission Date: 2/27/2022  Hospital Length of Stay: 2 days  Discharge Date and Time:  03/01/2022 7:25 AM  Attending Physician: Santos Horne DO   Discharging Provider: LETY Benites  Primary Care Provider: Wiliam Torres MD      HPI:   Mr. Roman Norman is an 81 y/o WM who is a resident at [facility] with past medical history of CVA, dysphagia related to CVA, [place], and COVID (1.5 weeks ago) sent to the ED by EMS for hypoxia, cough and lethargic. [place] nursing staff reports patient found to have O2 sat 88% which improved to 93% on O2 at 5L/NC, increase in cough and coarse breath sounds. Patient was found to have a LLL pneumonia and UTI during his ED course.       * No surgery found *      Hospital Course:   02/28/2022:  Today patient reports no new complaints.  No other issues reported by nursing.  03/01/2022; Today patient  reports no complaints over night, nursing reports the same. Patient is afebrile this morning with stable labs. Patient's Xray upon admission did show that he had a lower left lobe infiltrate, we will discharge patient back to [facility] on DuoNebs as well as oral Levaquin for the next 5 days. Nursing does report that the patient has been refusing his po medications and did refuse his swallowing study by Speech Therapy yesterday. Patient will need to be reassessed by [facility]'s Speech Therapy of Occupational Therapist upon readmission to their facility.   Patient has reach B.          Goals of Care Treatment Preferences:  Code Status: Full Code      Consults:   Consults (From admission, onward)        Status Ordering Provider     IP consult to case management  Once        Provider:  (Not yet assigned)    Completed SANTOS HORNE          No new Assessment & Plan notes have been filed under this hospital  service since the last note was generated.  Service: Hospital Medicine    Final Active Diagnoses:    Diagnosis Date Noted POA    PRINCIPAL PROBLEM:  Pneumonia due to infectious organism [J18.9] 02/27/2022 Yes    Acute cystitis without hematuria [N30.00] 02/27/2022 Yes    Dysphagia [R13.10] 02/27/2022 Yes      Problems Resolved During this Admission:    Diagnosis Date Noted Date Resolved POA    Dehydration [E86.0] 02/27/2022 03/01/2022 Yes       Discharged Condition: fair    Disposition: Skilled Nursing Facility    Follow Up:   Follow-up Information     Wiliam Torres MD In 1 week.    Specialty: Family Medicine  Why: Evaluate hydration and nutritional status  Contact information:  [address] [place] [id number] [phone]                       Patient Instructions:      Diet Dysphagia Pureed     Notify your health care provider if you experience any of the following:  temperature >100.4     Activity as tolerated       Significant Diagnostic Studies: Labs:   CBC   Recent Labs   Lab 02/27/22  1730 02/28/22  0439 03/01/22  0426   WBC 4.63 5.58 5.25   HGB 10.6* 9.0* 8.9*   HCT 29.1* 25.7* 20.1*    266 279     Microbiology: Blood Culture No results found for: LABBLOO  Radiology: X-Ray: CXR: X-Ray Chest 1 View (CXR): No results found for this visit on 02/27/22.  Cardiac Graphics:   Endoscopy:   Bronchoscopy: 0  Angiography: 0    Pending Diagnostic Studies:     None         Medications:  Reconciled Home Medications:      Medication List      START taking these medications    albuterol-ipratropium 2.5 mg-0.5 mg/3 mL nebulizer solution  Commonly known as: DUO-NEB  Take 3 mLs by nebulization every 6 (six) hours as needed for Wheezing. Rescue     levoFLOXacin 500 MG tablet  Commonly known as: LEVAQUIN  Take 1 tablet (500 mg total) by mouth once daily. for 5 days        CONTINUE taking these medications    tamsulosin 0.4 mg Cap  Commonly known as: FLOMAX  Take by mouth once daily.         STOP taking these medications    cetirizine 10 MG tablet  Commonly known as: ZYRTEC     cholecalciferol (vitamin D3) 125 mcg (5,000 unit) Tab     cyanocobalamin (vitamin B-12) 2,500 mcg Tab            Indwelling Lines/Drains at time of discharge:   Lines/Drains/Airways     None                 Time spent on the discharge of patient: 30 minutes         LETY Benites  Department of Hospital Medicine  [facility] - Medical Surgical Unit

## 2022-03-01 NOTE — PLAN OF CARE
Ss left message for Jorge at [facility] to return call. SS faxed dc clinicals to [facility]. Ss following.

## 2022-03-01 NOTE — PLAN OF CARE
[facility] - Medical Surgical Unit  Initial Discharge Assessment       Primary Care Provider: Wiliam Torres MD    Admission Diagnosis: FRANCISCO (acute kidney injury) [N17.9]  Hypotension [I95.9]  Pneumonia of left lower lobe due to infectious organism [J18.9]    Admission Date: 2/27/2022  Expected Discharge Date: 3/1/2022    Discharge Barriers Identified: None    Payor: MEDICARE / Plan: MEDICARE PART A & B / Product Type: Government /     No emergency contact information on file.    Discharge Plan A: Return to nursing home  Discharge Plan B: Return to Nursing Home    No Pharmacies Listed    Initial Assessment (most recent)     Adult Discharge Assessment - 02/28/22 1315        Discharge Assessment    Assessment Type Discharge Planning Assessment     Lives With --   [facility]    Discharge Plan A Return to nursing home     Discharge Plan B Return to Nursing Home     DME Needed Upon Discharge  none     Discharge Barriers Identified None               Ss spoke with Jorge at [facility] and pt can return today. Transportation set up for noon. PT dc back to [facility].

## 2022-03-01 NOTE — PLAN OF CARE
Problem: Adult Inpatient Plan of Care  Goal: Plan of Care Review  Outcome: Ongoing, Progressing     Problem: Adult Inpatient Plan of Care  Goal: Patient-Specific Goal (Individualized)  Outcome: Ongoing, Progressing     Problem: Adult Inpatient Plan of Care  Goal: Optimal Comfort and Wellbeing  Outcome: Ongoing, Progressing     Problem: Fluid Imbalance (Pneumonia)  Goal: Fluid Balance  Outcome: Ongoing, Progressing     Problem: Respiratory Compromise (Pneumonia)  Goal: Effective Oxygenation and Ventilation  Outcome: Ongoing, Progressing

## 2022-03-01 NOTE — NURSING
Discharged via [facility] Nursing van. Discharge instructions given to patient. Report called to facility nurse.

## 2022-04-10 NOTE — ED NOTES
Attempted in/out cath for urinalysis per order, unable to pass cath through urethra; stopped at prostate and would not pass.  Provider notified; instructed to wait and see if client voids.

## 2022-04-10 NOTE — ED PROVIDER NOTES
Encounter Date: 4/10/2022       History     Chief Complaint   Patient presents with    Altered Mental Status     HPI  Review of patient's allergies indicates:  No Known Allergies  Past Medical History:   Diagnosis Date    Chronic kidney disease, unspecified     COVID     Diabetes mellitus     Dysphagia following cerebrovascular accident     Falls frequently     Muscle weakness     Renal disorder     Stroke      No past surgical history on file.  Family History   Family history unknown: Yes     Social History     Tobacco Use    Smoking status: Unknown If Ever Smoked    Smokeless tobacco: Never Used   Substance Use Topics    Alcohol use: Not Currently    Drug use: Not Currently     Review of Systems    Physical Exam     Initial Vitals [04/10/22 1519]   BP Pulse Resp Temp SpO2   (!) 112/59 72 20 98.4 °F (36.9 °C) 97 %      MAP       --         Physical Exam    Medical Screening Exam   See Full Note    ED Course   Procedures  Labs Reviewed   COMPREHENSIVE METABOLIC PANEL - Abnormal; Notable for the following components:       Result Value    Glucose 131 (*)     BUN/Creatinine Ratio 24 (*)     Calcium 8.1 (*)     Total Protein 5.5 (*)     Albumin 1.8 (*)     ALT 7 (*)     AST 12 (*)     All other components within normal limits   NT-PRO NATRIURETIC PEPTIDE - Abnormal; Notable for the following components:    ProBNP 633 (*)     All other components within normal limits   CBC WITH DIFFERENTIAL - Abnormal; Notable for the following components:    RBC 3.25 (*)     Hemoglobin 8.5 (*)     Hematocrit 27.9 (*)     MCH 26.2 (*)     MCHC 30.5 (*)     RDW 18.5 (*)     MPV 8.6 (*)     Lymphocytes % 22.1 (*)     Monocytes % 8.6 (*)     Eosinophils % 4.7 (*)     All other components within normal limits   MANUAL DIFFERENTIAL - Abnormal; Notable for the following components:    Segmented Neutrophils, Man % 68 (*)     Lymphocytes, Man % 22 (*)     All other components within normal limits   MAGNESIUM - Normal   TROPONIN I -  Normal   CBC W/ AUTO DIFFERENTIAL    Narrative:     The following orders were created for panel order CBC auto differential.  Procedure                               Abnormality         Status                     ---------                               -----------         ------                     CBC with Differential[[id number]]        Abnormal            Final result               Manual Differential[[id number]]          Abnormal            Final result                 Please view results for these tests on the individual orders.   URINALYSIS, REFLEX TO URINE CULTURE        ECG Results          EKG 12-lead (In process)  Result time 04/10/22 15:32:28    In process by Interface, Lab In [facility] (04/10/22 15:32:28)                 Narrative:    Test Reason : R41.82,    Vent. Rate : 072 BPM     Atrial Rate : 072 BPM     P-R Int : 178 ms          QRS Dur : 144 ms      QT Int : 434 ms       P-R-T Axes : 045 -15 046 degrees     QTc Int : 475 ms    Normal sinus rhythm  Right bundle branch block  Abnormal ECG  When compared with ECG of 27-FEB-2022 17:41,  No significant change was found    Referred By: [email or web address]   SELF           Confirmed By:                             Imaging Results          CT Head Without Contrast (Final result)  Result time 04/10/22 16:30:07    Final result by Aldo Phoenix MD (04/10/22 16:30:07)                 Impression:      1. No acute intracranial hemorrhage.    2. Focal hypodensity within the left parietal lobe is favored to represent subacute/chronic cortical infarct.  However, no prior imaging is available for comparison.  Correlate with patient complaints and consider MRI imaging of the brain for further evaluation if clinically warranted.    3.  Generalized parenchymal atrophy and findings suggestive of sequela of chronic microvascular ischemia.    Place of service: [facility]      Electronically signed by: Aldo Phoenix  Date:    04/10/2022  Time:    16:30             Narrative:     EXAMINATION:  CT HEAD WITHOUT CONTRAST    CLINICAL HISTORY:  altered mental status;    TECHNIQUE:  Axial CT imaging from the vertex to skull the skull base was performed without contrast. Total DLP: 1115 mGy*cm    Dose reduction:    The CT exam was performed using one or more dose reduction techniques: Automatic exposure control, automated adjustment of the MA and/or kVP according to patient size, or use of iterative reconstruction technique.    COMPARISON:  None.    FINDINGS:  Prominent hypodensity is noted within the left parietal lobe with loss of gray-white matter differentiation most compatible with a cortical infarct which is favored to be subacute/chronic.  No prior imaging is however available for comparison.  Generalized atrophy is noted with prominence of the sulci and ventricles.  The basilar cisterns are within normal limits in appearance. There is no evidence of hydrocephalus, midline shift or mass effect.  Periventricular hypodensity changes are noted bilaterally, which are most compatible with sequela of chronic microvascular ischemia.  Otherwise, gray and white matter differentiation is generally preserved.  There is no CT evidence of acute intracranial hemorrhage or infarction.    The calvarium is intact. The visualized orbits and globes appear within normal limits. The paranasal sinuses and mastoid air cells are predominantly clear. Scalp soft tissues appear unremarkable.                               X-Ray Chest AP Portable (Final result)  Result time 04/10/22 17:52:37    Final result by Aldo Phoenix MD (04/10/22 17:52:37)                 Impression:      No acute cardiopulmonary process or significant change.    Place of service: [facility]      Electronically signed by: Aldo Phoenix  Date:    04/10/2022  Time:    17:52             Narrative:    EXAMINATION:  XR CHEST AP PORTABLE    CLINICAL HISTORY:  Altered mental status;    COMPARISON:  Prior radiograph February 27,  2022    FINDINGS:  The cardiomediastinal silhouette is within normal limits. The lungs are clear with similar mild underlying interstitial scarring changes suggested.  There is no pneumothorax or pleural effusion.    There is no acute osseous or soft tissue abnormality.                                 Medications - No data to display  Medical Decision Making:   ED Management:  Patients mental status back to baseline. NAD noted. Will discharge back to NH with instructions to f/u id symptoms worsen or fail to improve.                    Clinical Impression:   Final diagnoses:  [R41.82] Altered mental status, unspecified (Primary)          ED Disposition Condition    Discharge Stable        ED Prescriptions     None        Follow-up Information     Follow up With Specialties Details Why Contact Info    Wiliam Torres MD Family Medicine   [address] [place] [id number]  [phone]             LACIE Corcoran  04/10/22 3256

## 2022-04-10 NOTE — ED TRIAGE NOTES
SENT TO ER BY [facility] STAFF FOR CHANGE IN MENTAL STATUS.  STATES WAS UNABLE TO WAKE PT.  AT PRESENT  AWAKE, ALERT ORIENTED TO BASE LINE.   NAD NOTED.

## 2022-04-10 NOTE — ED PROVIDER NOTES
Encounter Date: 4/10/2022       History     Chief Complaint   Patient presents with    Altered Mental Status     Mr. Roman Norman is a 83 y/o WM with past medical history of CVA, dysphagia, generalized weakness, Type 2 DM, [place], CKD and cognitive communication deficiet who is brought to the ED by EMS from [facility] for altered mental status that occurred just PTA. EMS reports /63, HR 66, T 98, O2 sat 97% and  mg/dL. Patient is alert and talking, but very [place].  Call to [facility] to get report from staff. Nurse reports that patient was found unresponsive in bed with constricted pupils, then EMS was called. States that patient has not had any fever, vomiting or diarrhea at the nursing home. Denies any falls/injuries.      Of note, patient was discharged from [facility] for pneumonia on 03/01. Has history of dysphagia, declined swallowing test at that time.        Review of patient's allergies indicates:  No Known Allergies  Past Medical History:   Diagnosis Date    Chronic kidney disease, unspecified     COVID     Diabetes mellitus     Dysphagia following cerebrovascular accident     Falls frequently     Muscle weakness     Renal disorder     Stroke      No past surgical history on file.  Family History   Family history unknown: Yes     Social History     Tobacco Use    Smoking status: Unknown If Ever Smoked    Smokeless tobacco: Never Used   Substance Use Topics    Alcohol use: Not Currently    Drug use: Not Currently     Review of Systems   Constitutional: Positive for activity change. Negative for appetite change, fatigue and fever.   HENT: Negative for ear pain and sore throat.         [place]   Eyes: Negative.    Respiratory: Negative.    Cardiovascular: Negative.  Negative for chest pain and leg swelling.   Gastrointestinal: Negative.  Negative for abdominal pain, constipation, diarrhea, nausea and vomiting.   Genitourinary: Negative for dysuria and frequency.    Musculoskeletal: Negative.    Skin: Negative for rash.   Neurological: Positive for weakness. Negative for dizziness, tremors and headaches.        Generalized weakness   Hematological: Negative.    Psychiatric/Behavioral: Negative.        Physical Exam     Initial Vitals [04/10/22 1519]   BP Pulse Resp Temp SpO2   (!) 112/59 72 20 98.4 °F (36.9 °C) 97 %      MAP       --         Physical Exam    Nursing note and vitals reviewed.  Constitutional: Vital signs are normal. He appears well-developed and well-nourished. He is cooperative.  Non-toxic appearance. He does not appear ill. No distress.   HENT:   Head: Normocephalic.   Right Ear: External ear and ear canal normal.   Left Ear: External ear and ear canal normal.   Mouth/Throat: Uvula is midline, oropharynx is clear and moist and mucous membranes are normal.   Bilateral TM partially obscured by cerumen. Tongue midline on protrusion.   Eyes: Conjunctivae and EOM are normal. Pupils are equal, round, and reactive to light.   Neck: Neck supple.   Cardiovascular: Normal rate, regular rhythm, normal heart sounds and normal pulses.   Pulses:       Radial pulses are 2+ on the right side and 2+ on the left side.        Posterior tibial pulses are 2+ on the right side and 2+ on the left side.   Pulmonary/Chest: Effort normal and breath sounds normal. He has no wheezes. He has no rhonchi. He has no rales.   Abdominal: Abdomen is soft. Bowel sounds are normal. There is no abdominal tenderness.   Musculoskeletal:      Cervical back: Neck supple.     Lymphadenopathy:     He has no cervical adenopathy.   Neurological: He is alert. He is disoriented. No cranial nerve deficit or sensory deficit. Coordination normal. GCS eye subscore is 4. GCS verbal subscore is 5. GCS motor subscore is 6.   A/O x2-baseline   Skin: Skin is warm and dry. Capillary refill takes less than 2 seconds. No rash noted. No cyanosis. Nails show no clubbing.   Psychiatric: He has a normal mood and affect.  His speech is normal and behavior is normal. Cognition and memory are impaired. He exhibits abnormal recent memory.   Patient is very [place], but answers questions appropriately         Medical Screening Exam   See Full Note    ED Course   Procedures  Labs Reviewed   CBC W/ AUTO DIFFERENTIAL    Narrative:     The following orders were created for panel order CBC auto differential.  Procedure                               Abnormality         Status                     ---------                               -----------         ------                     CBC with Differential[[id number]]                            In process                 Manual Differential[[id number]]                              In process                   Please view results for these tests on the individual orders.   COMPREHENSIVE METABOLIC PANEL   MAGNESIUM   TROPONIN I   URINALYSIS, REFLEX TO URINE CULTURE   NT-PRO NATRIURETIC PEPTIDE   CBC WITH DIFFERENTIAL   MANUAL DIFFERENTIAL     EKG Readings: (Independently Interpreted)   Initial Reading: No STEMI. Previous EKG: Compared with most recent EKG Previous EKG Date: 02/27/2022. Rhythm: Normal Sinus Rhythm. Heart Rate: 72. Conduction: RBBB. Other Impression: unchanged from previous EKG     ECG Results          EKG 12-lead (In process)  Result time 04/10/22 15:32:28    In process by Interface, Lab In [facility] (04/10/22 15:32:28)                 Narrative:    Test Reason : R41.82,    Vent. Rate : 072 BPM     Atrial Rate : 072 BPM     P-R Int : 178 ms          QRS Dur : 144 ms      QT Int : 434 ms       P-R-T Axes : 045 -15 046 degrees     QTc Int : 475 ms    Normal sinus rhythm  Right bundle branch block  Abnormal ECG  When compared with ECG of 27-FEB-2022 17:41,  No significant change was found    Referred By: [email or web address]   SELF           Confirmed By:                             Imaging Results          CT Head Without Contrast (Final result)  Result time 04/10/22 16:30:07    Final result by Aldo PERKINS  MD Lizett (04/10/22 16:30:07)                 Impression:      1. No acute intracranial hemorrhage.    2. Focal hypodensity within the left parietal lobe is favored to represent subacute/chronic cortical infarct.  However, no prior imaging is available for comparison.  Correlate with patient complaints and consider MRI imaging of the brain for further evaluation if clinically warranted.    3.  Generalized parenchymal atrophy and findings suggestive of sequela of chronic microvascular ischemia.    Place of service: [facility]      Electronically signed by: Aldo Phoenix  Date:    04/10/2022  Time:    16:30             Narrative:    EXAMINATION:  CT HEAD WITHOUT CONTRAST    CLINICAL HISTORY:  altered mental status;    TECHNIQUE:  Axial CT imaging from the vertex to skull the skull base was performed without contrast. Total DLP: 1115 mGy*cm    Dose reduction:    The CT exam was performed using one or more dose reduction techniques: Automatic exposure control, automated adjustment of the MA and/or kVP according to patient size, or use of iterative reconstruction technique.    COMPARISON:  None.    FINDINGS:  Prominent hypodensity is noted within the left parietal lobe with loss of gray-white matter differentiation most compatible with a cortical infarct which is favored to be subacute/chronic.  No prior imaging is however available for comparison.  Generalized atrophy is noted with prominence of the sulci and ventricles.  The basilar cisterns are within normal limits in appearance. There is no evidence of hydrocephalus, midline shift or mass effect.  Periventricular hypodensity changes are noted bilaterally, which are most compatible with sequela of chronic microvascular ischemia.  Otherwise, gray and white matter differentiation is generally preserved.  There is no CT evidence of acute intracranial hemorrhage or infarction.    The calvarium is intact. The visualized orbits and globes appear within normal limits.  The paranasal sinuses and mastoid air cells are predominantly clear. Scalp soft tissues appear unremarkable.                               X-Ray Chest AP Portable (In process)                  Medications - No data to display  Medical Decision Making:   ED Management:  Nurse attempted to cath patient for urinalysis, but unsuccessful due to met resistance.   16:40 Asked patient to try to void for urinalysis. States that he does not need to void at this time. Patient is incontinent and wears adult diaper.                   Clinical Impression:   Final diagnoses:  [R41.82] Altered mental status, unspecified (Primary)                      Parul Styles, LETY  04/10/22 5758

## 2022-06-13 PROBLEM — I95.9 HYPOTENSIVE EPISODE: Status: ACTIVE | Noted: 2022-01-01

## 2022-06-13 PROBLEM — D64.9 SYMPTOMATIC ANEMIA: Status: ACTIVE | Noted: 2022-01-01

## 2022-06-13 PROBLEM — R19.5 OCCULT BLOOD POSITIVE STOOL: Status: ACTIVE | Noted: 2022-01-01

## 2022-06-13 NOTE — ED PROVIDER NOTES
Encounter Date: 6/13/2022       History     Chief Complaint   Patient presents with    Anemia     Mr. Roman Norman is an 81 y/o WM who is brought to the ED by EMS from [facility] with  H&H 6.9/23. Report from [facility] staff reports bp 98/54 with HR 81. BP normally runs 130s systolic and 70s diastolic. Has past medical history of CVA with left sided weakness, DM2, [place], poor eye sight related to macular degeneration and CKD. Patient is not on any anticoagulants. Denies any bright red or dark colored stools. Patient  Reports that he has been feeling weak and dizzy at times. Denies any nausea, vomiting or diarrhea. Last colonoscopy was more than 10 years ago and had one polyp removed at that time.   Last H&H 04/10/2022 was 8.5/27.9.  Code status: Full Code    The history is provided by the patient, the nursing home and the EMS personnel.     Review of patient's allergies indicates:  No Known Allergies  Past Medical History:   Diagnosis Date    Chronic kidney disease, unspecified     COVID     Diabetes mellitus     Dysphagia following cerebrovascular accident     Falls frequently     Muscle weakness     Renal disorder     Stroke      History reviewed. No pertinent surgical history.  Family History   Family history unknown: Yes     Social History     Tobacco Use    Smoking status: Unknown If Ever Smoked    Smokeless tobacco: Never Used   Substance Use Topics    Alcohol use: Not Currently    Drug use: Not Currently     Review of Systems   Constitutional: Positive for fatigue. Negative for activity change, appetite change and fever.   HENT: Negative.  Negative for congestion, ear pain, sinus pressure, sinus pain, sore throat and trouble swallowing.         [place]   Eyes: Positive for pain. Negative for visual disturbance.   Respiratory: Negative for cough and shortness of breath.    Cardiovascular: Negative.    Gastrointestinal: Positive for rectal pain. Negative for abdominal pain, anal bleeding, blood  in stool, constipation, diarrhea, nausea and vomiting.   Endocrine: Negative.    Genitourinary: Negative for dysuria and frequency.   Musculoskeletal: Negative.    Skin: Negative.    Neurological: Positive for dizziness and weakness. Negative for headaches.   Psychiatric/Behavioral: Negative.        Physical Exam     Initial Vitals   BP Pulse Resp Temp SpO2   06/13/22 0921 06/13/22 0921 06/13/22 0921 06/13/22 0921 06/13/22 0941   (!) 107/47 78 12 97.7 °F (36.5 °C) 99 %      MAP       --                Physical Exam    Nursing note and vitals reviewed.  Constitutional: He appears well-developed and well-nourished. He is cooperative.   HENT:   Head: Normocephalic.   Right Ear: External ear normal. Decreased hearing is noted.   Left Ear: External ear normal. Decreased hearing is noted.   Mouth/Throat: Mucous membranes are normal.   Eyes: Pupils are equal, round, and reactive to light.   Bilateral conjunctiva pale   Cardiovascular: Normal rate, regular rhythm, normal heart sounds and normal pulses.   Pulmonary/Chest: Effort normal and breath sounds normal.   Abdominal: Abdomen is soft. Bowel sounds are normal. There is no abdominal tenderness.     Neurological: He is alert. No cranial nerve deficit or sensory deficit.   A/O x2   Skin: Skin is warm, dry and intact. Capillary refill takes less than 2 seconds. No rash noted.   Psychiatric: He has a normal mood and affect. His speech is normal and behavior is normal.         Medical Screening Exam   See Full Note    ED Course   Procedures  Labs Reviewed   OCCULT BLOOD X 1, STOOL - Abnormal; Notable for the following components:       Result Value    Occult Blood Positive (*)     All other components within normal limits   SARS ANTIGEN(COLLINS) - Normal    Narrative:     Negative SARS-CoV results should not be used as the sole basis for treatment or patient management decisions; negative results should be considered in the context of a patient's recent exposures, history and the  presene of clinical signs and symptoms consistent with COVID-19.  Negative results should be treated as presumptive and confirmed by molecular assay, if necessary for patient management.   FERRITIN   IRON AND TIBC   RETICULOCYTES          Imaging Results    None          Medications   sodium chloride 0.9% bolus 1,000 mL (0 mLs Intravenous Stopped 6/13/22 1055)   0.9%  NaCl infusion (1,000 mLs Intravenous New Bag 6/13/22 1119)     Medical Decision Making:   ED Management:  BP dropped to 86/38 with HR 73, gave bolus of Normal Saline. BP improved 112/46. Hemoccult positive, H&H 6.9/23.1 with dizziness present.  11:18 BP dropped to 98/54 will continue NS at 100 ml/hr. Patient having symptomatic anemia, hypotension and hemoccult positive stool. Patient and caregiver wants to have work up done for GI bleed. Will need work up by GI. [facility] transfer order placed.  Other:   I have discussed this case with another health care provider.       <> Summary of the Discussion: 11:59 Call from [facility]. Spoke with Dr. Benitez patient can be scoped as an outpatient.   12:33 Spoke with Dr. Alas, will admit patient here at [facility] for transfusion 1 unit of PRBCs and fluid hydration. Waiting for bed placement.                   Clinical Impression:   Final diagnoses:  [D64.9] Symptomatic anemia (Primary)  [R19.5] Occult blood positive stool  [I95.9] Hypotensive episode          ED Disposition Condition    Admit               Parul Styles, [facility]  06/13/22 1310       Parul Styles, [facility]  06/13/22 3031

## 2022-06-13 NOTE — HPI
Mr. Roman Norman is an 83 y/o WM is a nursing home resident from [facility] with pmh of CVA with left sided weakness, DM2 (diet controlled, [place], poor eye sight related to macular degeneration and CKD is admitted to [facility] from for blood transfusion for symptomatic anemia with H&H of 6.9/23 that was drawn today. Patient's last H&H from 04/10/2022 was 8.5/27.9. At ED patient was hypotensive with BP 86/38 and HR 73, Received NS IV fluids and BP improved. Labs: glucose 73 mg/dL, BUN/CR 17/0.81. Hemoccult positive. Iron studies pending. Discussed patient's case with Dr. Benitez at [facility]. Dr. Benitez will follow up as outpatient for scope if needed once patient is discharged from hospital.       Code status: Full Code

## 2022-06-14 PROBLEM — I95.9 HYPOTENSIVE EPISODE: Status: RESOLVED | Noted: 2022-01-01 | Resolved: 2022-01-01

## 2022-06-14 NOTE — HOSPITAL COURSE
06/14/2022: Hospital day #2 for symptomatic anemia with hypotension. Patient received 1 unit of PRBCs, today H&H up to 9.2/30. BP has improved to 113/59 with no further episodes of hypotension. Iron studies revealed: ferritin 115, iron 16, iron sat 11, TIBC 140, Retic 23.3. Will refer patient to GI for follow up of anemia and hemoccult + stool. Patient reports feeling better today. Denies any dizziness.

## 2022-06-14 NOTE — SUBJECTIVE & OBJECTIVE
Past Medical History:   Diagnosis Date    Chronic kidney disease, unspecified     COVID     Diabetes mellitus     Dysphagia following cerebrovascular accident     Falls frequently     Muscle weakness     Renal disorder     Stroke        History reviewed. No pertinent surgical history.    Review of patient's allergies indicates:  No Known Allergies    No current facility-administered medications on file prior to encounter.     Current Outpatient Medications on File Prior to Encounter   Medication Sig    albuterol-ipratropium (DUO-NEB) 2.5 mg-0.5 mg/3 mL nebulizer solution Take 3 mLs by nebulization every 6 (six) hours as needed for Wheezing. Rescue    cyanocobalamin (VITAMIN B-12) 1000 MCG tablet Take 2,500 mcg by mouth once daily.    cyproheptadine (PERIACTIN) 4 mg tablet Take 4 mg by mouth 3 (three) times daily as needed.    multivitamin capsule Take 1 capsule by mouth once daily.    tamsulosin (FLOMAX) 0.4 mg Cap Take by mouth once daily.    [DISCONTINUED] cetirizine (ZYRTEC) 10 MG tablet Take 10 mg by mouth once daily.     Family History       Family history is unknown by patient.          Tobacco Use    Smoking status: Unknown If Ever Smoked    Smokeless tobacco: Never Used   Substance and Sexual Activity    Alcohol use: Not Currently    Drug use: Not Currently    Sexual activity: Not Currently     Review of Systems   Constitutional:  Positive for fatigue. Negative for activity change, appetite change and fever.   HENT:  Positive for hearing loss, sore throat and trouble swallowing. Negative for congestion and ear pain.         [place]   Eyes: Negative.  Negative for photophobia and pain.        Poor eye sight related to macular degeneration   Respiratory: Negative.  Negative for cough and shortness of breath.    Cardiovascular: Negative.  Negative for chest pain, palpitations and leg swelling.   Gastrointestinal: Negative.  Negative for abdominal pain, constipation, diarrhea, nausea and vomiting.   Genitourinary:  Negative.  Negative for dysuria and frequency.   Musculoskeletal: Negative.    Skin:  Positive for pallor.   Neurological:  Positive for dizziness and weakness. Negative for numbness and headaches.        Hx CVA with left sided weakness   Hematological: Negative.    Psychiatric/Behavioral: Negative.     Objective:     Vital Signs (Most Recent):  Temp: 98.3 °F (36.8 °C) (06/13/22 1545)  Pulse: 71 (06/13/22 1545)  Resp: 16 (06/13/22 1545)  BP: (!) 105/56 (06/13/22 1545)  SpO2: 100 % (06/13/22 1545)   Vital Signs (24h Range):  Temp:  [97.7 °F (36.5 °C)-98.3 °F (36.8 °C)] 98.3 °F (36.8 °C)  Pulse:  [68-78] 71  Resp:  [9-16] 16  SpO2:  [98 %-100 %] 100 %  BP: ()/(38-77) 105/56     Weight: 68 kg (150 lb)  Body mass index is 21.52 kg/m².    Physical Exam  Vitals reviewed.   Constitutional:       General: He is awake.      Appearance: Normal appearance. He is well-developed and normal weight. He is ill-appearing.   HENT:      Head: Normocephalic.      Right Ear: External ear normal.      Left Ear: External ear normal.      Mouth/Throat:      Mouth: Mucous membranes are dry.   Eyes:      Pupils: Pupils are equal, round, and reactive to light.      Comments: Bilateral conjunctiva pale   Cardiovascular:      Rate and Rhythm: Normal rate and regular rhythm.      Pulses: Normal pulses.           Radial pulses are 2+ on the right side and 2+ on the left side.        Posterior tibial pulses are 2+ on the right side and 2+ on the left side.   Pulmonary:      Effort: Pulmonary effort is normal.      Breath sounds: Normal breath sounds and air entry.   Abdominal:      General: Bowel sounds are normal.      Palpations: Abdomen is soft.      Tenderness: There is no abdominal tenderness.   Lymphadenopathy:      Cervical: No cervical adenopathy.   Skin:     General: Skin is warm and dry.      Capillary Refill: Capillary refill takes less than 2 seconds.      Coloration: Skin is pale.      Findings: No rash.   Neurological:       Mental Status: He is alert. Mental status is at baseline.      Sensory: Sensation is intact.      Motor: Weakness present.      Comments: Oriented x2   Psychiatric:         Mood and Affect: Mood normal.         Speech: Speech normal.         Behavior: Behavior normal. Behavior is cooperative.         CRANIAL NERVES     CN III, IV, VI   Pupils are equal, round, and reactive to light.     Significant Labs: All pertinent labs within the past 24 hours have been reviewed.  CBC:   Recent Labs   Lab 06/13/22  0535   WBC 5.07   HGB 6.9*   HCT 23.1*        CMP:   Recent Labs   Lab 06/13/22  0535      K 4.1      CO2 28   GLU 73*   BUN 17   CREATININE 0.81   CALCIUM 8.6   PROT 5.7*   ALBUMIN 1.8*   BILITOT 0.2   ALKPHOS 70   AST 11*   ALT 7*   ANIONGAP 10   EGFRNONAA 97       Significant Imaging: I have reviewed all pertinent imaging results/findings within the past 24 hours.

## 2022-06-14 NOTE — ASSESSMENT & PLAN NOTE
06/13/2022: H&H 6.9/23.1.  -Transfuse 1 unit of PRBCs  -1/2 NS at 100 ml/hr   -Pantoprazole 40 mg one tab by mouth daily  -CBC daily x 3  -BMP daily x 3  -Iron/TIBC, Ferritin and Retic count pending

## 2022-06-14 NOTE — DISCHARGE SUMMARY
[facility] Medical Surgical Unit  [facility] Medicine  Discharge Summary      Patient Name: Roman Norman  MRN: [id number]  Patient Class: IP- Inpatient  Admission Date: 6/13/2022  Hospital Length of Stay: 1 days  Discharge Date and Time:  06/14/2022 11:38 AM  Attending Physician: Vinicius Horne DO   Discharging Provider: LETY Askew  Primary Care Provider: Wiliam Torres MD      HPI:   Mr. Roman Norman is an 81 y/o WM is a nursing home resident from [facility] with pmh of CVA with left sided weakness, DM2 (diet controlled, [employer], poor eye sight related to macular degeneration and CKD is admitted to [facility] from for blood transfusion for symptomatic anemia with H&H of 6.9/23 that was drawn today. Patient's last H&H from 04/10/2022 was 8.5/27.9. At ED patient was hypotensive with BP 86/38 and HR 73, Received NS IV fluids and BP improved. Labs: glucose 73 mg/dL, BUN/CR 17/0.81. Hemoccult positive. Iron studies pending. Discussed patient's case with Dr. Benitez at [facility]. Dr. Benitez will follow up as outpatient for scope if needed once patient is discharged from hospital.       Code status: Full Code      * No surgery found *      Hospital Course:   06/14/2022: Hospital day #2 for symptomatic anemia with hypotension. Patient received 1 unit of PRBCs, today H&H up to 9.2/30. BP has improved to 113/59 with no further episodes of hypotension. Iron studies revealed: ferritin 115, iron 16, iron sat 11, TIBC 140, Retic 23.3. Will refer patient to GI for follow up of anemia and hemoccult + stool. Patient reports feeling better today. Denies any dizziness.        Goals of Care Treatment Preferences:  Code Status: Full Code      Consults:     * Symptomatic anemia  06/13/2022: H&H 6.9/23.1.  -Transfuse 1 unit of PRBCs  -1/2 NS at 100 ml/hr   -Pantoprazole 40 mg one tab by mouth daily  -CBC daily x 3  -BMP daily x 3  -Iron/TIBC, Ferritin and Retic count pending    06/14/2022: H&H Improved to 9.2/30.  Hypotension and dizziness resolved.  -Ferrous sulfate 325 mg one tab by mouth BID  -Vitamin C 500 mg one tab by mouth daily  -Colace 100 mg one cap at HS to prevent constipation  -Referral to GI made for evaluation of anemia/hemoccult +  -Follow up with PCP with in one week for repeat H&H      Occult blood positive stool  06/13/2022: Patient will need referral to Dr. Benitez at discharge to follow up outpatient.   06/14/2022: Treatment as above      Final Active Diagnoses:    Diagnosis Date Noted POA    PRINCIPAL PROBLEM:  Symptomatic anemia [D64.9] 06/13/2022 Yes    Occult blood positive stool [R19.5] 06/13/2022 Yes      Problems Resolved During this Admission:    Diagnosis Date Noted Date Resolved POA    Hypotensive episode [I95.9] 06/13/2022 06/14/2022 Yes       Discharged Condition: stable    Disposition: Skilled Nursing Facility    Follow Up:   Contact information for follow-up providers     Sergei Benitez MD Follow up on 7/5/2022.    Why: follow up:colonscope july 5@7:30  Contact information:  [address] [place] [address]  [phone]                 Contact information for after-discharge care     Destination     [facility] Follow up in 1 week(s).    Service: Nursing Home  Why: Follow up with PCP one week with repeat H&H  Contact information:  [address] [place] [address]  [phone]                           Patient Instructions:      Diet Adult Regular     Activity as tolerated       Significant Diagnostic Studies: Labs:   BMP:   Recent Labs   Lab 06/13/22  0535 06/14/22  0533   GLU 73* 71*    141   K 4.1 3.9    108*   CO2 28 27   BUN 17 14   CREATININE 0.81 0.80   CALCIUM 8.6 8.5    and CBC   Recent Labs   Lab 06/13/22  0535 06/14/22  0533   WBC 5.07 4.42*   HGB 6.9* 9.2*   HCT 23.1* 30.0*    226       Pending Diagnostic Studies:     Procedure Component Value Units Date/Time    Prealbumin [[id number]] Collected: 06/13/22 1611    Order  Status: Sent Lab Status: In process Updated: 06/13/22 1616    Specimen: Blood     Urinalysis, Reflex to Urine Culture [[id number]]     Order Status: Sent Lab Status: No result     Specimen: Urine          Medications:  Reconciled Home Medications:      Medication List      START taking these medications    ascorbic acid (vitamin C) 500 mg Chew  Commonly known as: VITAMIN C  Take 500 mg by mouth once daily.     docusate sodium 100 MG capsule  Commonly known as: COLACE  Take 1 capsule (100 mg total) by mouth every evening.     ferrous sulfate 325 (65 FE) MG EC tablet  Take 1 tablet (325 mg total) by mouth 2 (two) times daily.        CONTINUE taking these medications    albuterol-ipratropium 2.5 mg-0.5 mg/3 mL nebulizer solution  Commonly known as: DUO-NEB  Take 3 mLs by nebulization every 6 (six) hours as needed for Wheezing. Rescue     cyanocobalamin 1000 MCG tablet  Commonly known as: VITAMIN B-12  Take 2,500 mcg by mouth once daily.     cyproheptadine 4 mg tablet  Commonly known as: PERIACTIN  Take 4 mg by mouth 3 (three) times daily as needed.     multivitamin capsule  Take 1 capsule by mouth once daily.     tamsulosin 0.4 mg Cap  Commonly known as: FLOMAX  Take by mouth once daily.        STOP taking these medications    cetirizine 10 MG tablet  Commonly known as: ZYRTEC            Indwelling Lines/Drains at time of discharge:   Lines/Drains/Airways     None                 Time spent on the discharge of patient: 40 minutes       Discussed patient's case, labs and medication reconciliation done with Dr. Alas. He agree with the above discharge plans.     LETY Askew  Department of Hospital Medicine  [facility] Surgical Unit

## 2022-06-14 NOTE — PLAN OF CARE
[facility] - Medical Surgical Unit  Discharge Final Note    Primary Care Provider: Wiliam Torres MD    Expected Discharge Date: 6/14/2022    Final Discharge Note (most recent)     Final Note - 06/14/22 1153        Final Note    Assessment Type Final Discharge Note     Anticipated Discharge Disposition intermediate Nursing Home     What phone number can be called within the next 1-3 days to see how you are doing after discharge? [phone]        Post-Acute Status    Discharge Delays None known at this time             pt dc to [facility] and dc clinical faxed.     Important Message from Medicare              Follow-up providers     Sergei Benitez MD    [address] [place] [address] [place] [address]  [phone]       Next Steps: Follow up on 7/5/2022    Instructions: follow up:colonscope july 5@7:30          After-discharge care            Destination     [facility]   Service: Nursing Home    [address] [place] [address] [place] [address]   Phone: [phone]       Next Steps: Follow up in 1 week(s)    Instructions: Follow up with PCP one week with repeat H&H

## 2022-06-14 NOTE — ASSESSMENT & PLAN NOTE
06/13/2022: Patient will need referral to Dr. Benitez at discharge to follow up outpatient.   06/14/2022: Treatment as above

## 2022-06-14 NOTE — H&P
[facility] Medicine  History & Physical    Patient Name: Roman Norman  MRN: [id number]  Patient Class: IP- Inpatient  Admission Date: 6/13/2022  Attending Physician: Vinicius Horne DO   Primary Care Provider: Wiliam Torres MD         Patient information was obtained from patient, EMS personnel, nursing home and ER records.     Subjective:     Principal Problem:Symptomatic anemia    Chief Complaint:   Chief Complaint   Patient presents with    Anemia        HPI: Mr. Roman Norman is an 83 y/o WM is a nursing home resident from [facility] with pmh of CVA with left sided weakness, DM2 (diet controlled, [employer] [employer] [place], poor eye sight related to macular degeneration and CKD is admitted to [facility] from for blood transfusion for symptomatic anemia with H&H of 6.9/23 that was drawn today. Patient's last H&H from 04/10/2022 was 8.5/27.9. At ED patient was hypotensive with BP 86/38 and HR 73, Received NS IV fluids and BP improved. Labs: glucose 73 mg/dL, BUN/CR 17/0.81. Hemoccult positive. Iron studies pending. Discussed patient's case with Dr. Benitez at [facility]. Dr. Benitez will follow up as outpatient for scope if needed once patient is discharged from hospital.       Code status: Full Code      Past Medical History:   Diagnosis Date    Chronic kidney disease, unspecified     COVID     Diabetes mellitus     Dysphagia following cerebrovascular accident     Falls frequently     Muscle weakness     Renal disorder     Stroke        History reviewed. No pertinent surgical history.    Review of patient's allergies indicates:  No Known Allergies    No current facility-administered medications on file prior to encounter.     Current Outpatient Medications on File Prior to Encounter   Medication Sig    albuterol-ipratropium (DUO-NEB) 2.5 mg-0.5 mg/3 mL nebulizer solution Take 3 mLs by nebulization every 6 (six) hours as needed for Wheezing. Rescue    cyanocobalamin (VITAMIN B-12) 1000  MCG tablet Take 2,500 mcg by mouth once daily.    cyproheptadine (PERIACTIN) 4 mg tablet Take 4 mg by mouth 3 (three) times daily as needed.    multivitamin capsule Take 1 capsule by mouth once daily.    tamsulosin (FLOMAX) 0.4 mg Cap Take by mouth once daily.    [DISCONTINUED] cetirizine (ZYRTEC) 10 MG tablet Take 10 mg by mouth once daily.     Family History       Family history is unknown by patient.          Tobacco Use    Smoking status: Unknown If Ever Smoked    Smokeless tobacco: Never Used   Substance and Sexual Activity    Alcohol use: Not Currently    Drug use: Not Currently    Sexual activity: Not Currently     Review of Systems   Constitutional:  Positive for fatigue. Negative for activity change, appetite change and fever.   HENT:  Positive for hearing loss, sore throat and trouble swallowing. Negative for congestion and ear pain.         [place]   Eyes: Negative.  Negative for photophobia and pain.        Poor eye sight related to macular degeneration   Respiratory: Negative.  Negative for cough and shortness of breath.    Cardiovascular: Negative.  Negative for chest pain, palpitations and leg swelling.   Gastrointestinal: Negative.  Negative for abdominal pain, constipation, diarrhea, nausea and vomiting.   Genitourinary: Negative.  Negative for dysuria and frequency.   Musculoskeletal: Negative.    Skin:  Positive for pallor.   Neurological:  Positive for dizziness and weakness. Negative for numbness and headaches.        Hx CVA with left sided weakness   Hematological: Negative.    Psychiatric/Behavioral: Negative.     Objective:     Vital Signs (Most Recent):  Temp: 98.3 °F (36.8 °C) (06/13/22 1545)  Pulse: 71 (06/13/22 1545)  Resp: 16 (06/13/22 1545)  BP: (!) 105/56 (06/13/22 1545)  SpO2: 100 % (06/13/22 1545)   Vital Signs (24h Range):  Temp:  [97.7 °F (36.5 °C)-98.3 °F (36.8 °C)] 98.3 °F (36.8 °C)  Pulse:  [68-78] 71  Resp:  [9-16] 16  SpO2:  [98 %-100 %] 100 %  BP: ()/(38-77) 105/56      Weight: 68 kg (150 lb)  Body mass index is 21.52 kg/m².    Physical Exam  Vitals reviewed.   Constitutional:       General: He is awake.      Appearance: Normal appearance. He is well-developed and normal weight. He is ill-appearing.   HENT:      Head: Normocephalic.      Right Ear: External ear normal.      Left Ear: External ear normal.      Mouth/Throat:      Mouth: Mucous membranes are dry.   Eyes:      Pupils: Pupils are equal, round, and reactive to light.      Comments: Bilateral conjunctiva pale   Cardiovascular:      Rate and Rhythm: Normal rate and regular rhythm.      Pulses: Normal pulses.           Radial pulses are 2+ on the right side and 2+ on the left side.        Posterior tibial pulses are 2+ on the right side and 2+ on the left side.   Pulmonary:      Effort: Pulmonary effort is normal.      Breath sounds: Normal breath sounds and air entry.   Abdominal:      General: Bowel sounds are normal.      Palpations: Abdomen is soft.      Tenderness: There is no abdominal tenderness.   Lymphadenopathy:      Cervical: No cervical adenopathy.   Skin:     General: Skin is warm and dry.      Capillary Refill: Capillary refill takes less than 2 seconds.      Coloration: Skin is pale.      Findings: No rash.   Neurological:      Mental Status: He is alert. Mental status is at baseline.      Sensory: Sensation is intact.      Motor: Weakness present.      Comments: Oriented x2   Psychiatric:         Mood and Affect: Mood normal.         Speech: Speech normal.         Behavior: Behavior normal. Behavior is cooperative.         CRANIAL NERVES     CN III, IV, VI   Pupils are equal, round, and reactive to light.     Significant Labs: All pertinent labs within the past 24 hours have been reviewed.  CBC:   Recent Labs   Lab 06/13/22  0535   WBC 5.07   HGB 6.9*   HCT 23.1*        CMP:   Recent Labs   Lab 06/13/22  0535      K 4.1      CO2 28   GLU 73*   BUN 17   CREATININE 0.81   CALCIUM 8.6    PROT 5.7*   ALBUMIN 1.8*   BILITOT 0.2   ALKPHOS 70   AST 11*   ALT 7*   ANIONGAP 10   EGFRNONAA 97       Significant Imaging: I have reviewed all pertinent imaging results/findings within the past 24 hours.    Assessment/Plan:     * Symptomatic anemia  06/13/2022: H&H 6.9/23.1.  -Transfuse 1 unit of PRBCs  -1/2 NS at 100 ml/hr   -Pantoprazole 40 mg one tab by mouth daily  -CBC daily x 3  -BMP daily x 3  -Iron/TIBC, Ferritin and Retic count pending      Hypotensive episode  06/13/2022: Will continue on 1/2 NS at 100 ml/hr  -Telemetry        Occult blood positive stool  06/13/2022: Patient will need referral to Dr. Benitez at discharge to follow up outpatient.       VTE Risk Mitigation (From admission, onward)         Ordered     IP VTE HIGH RISK PATIENT  Once         06/13/22 1546     Place EMMANUEL hose  Until discontinued         06/13/22 1546     Place sequential compression device  Until discontinued         06/13/22 1546               Discussed patient's case, labs and medication reconciliation done with Dr. Alas. He agreed to the above POC.     Parul Styles, LETY  Department of Hospital Medicine   [facility] - Medical Surgical Unit

## 2022-06-14 NOTE — PLAN OF CARE
[facility] - Medical Surgical Unit  Initial Discharge Assessment       Primary Care Provider: Wiliam Torres MD    Admission Diagnosis: Occult blood positive stool [R19.5]  Hypotensive episode [I95.9]  Symptomatic anemia [D64.9]    Admission Date: 6/13/2022  Expected Discharge Date:          Payor: MEDICARE / Plan: MEDICARE PART A & B / Product Type: Government /     No emergency contact information on file.    Discharge Plan A: Return to nursing home  Discharge Plan B: Return to Nursing Home    No Pharmacies Listed    Initial Assessment (most recent)     Adult Discharge Assessment - 06/14/22 1024        Discharge Assessment    Assessment Type Discharge Planning Assessment     Source of Information other (see comments)     Lives With other (see comments)   [facility] resident    Do you expect to return to your current living situation? Yes     Discharge Plan A Return to nursing home     Discharge Plan B Return to Nursing Home     DME Needed Upon Discharge  none               Pt is a resident at [facility]. SS called [facility] for emergency contact number. SS attempted to updated dc plan with  Nicolle Calabrese, [phone] and mailbox full. SS updated [facility] on planned return today. Ss faxed clinicals to [facility]. Ss following.

## 2022-06-14 NOTE — NURSING
Patient awake alert, vision impaired, hearing impaired patient being discharged back to [facility], no pain or discomfort reported.

## 2022-06-14 NOTE — ASSESSMENT & PLAN NOTE
06/13/2022: H&H 6.9/23.1.  -Transfuse 1 unit of PRBCs  -1/2 NS at 100 ml/hr   -Pantoprazole 40 mg one tab by mouth daily  -CBC daily x 3  -BMP daily x 3  -Iron/TIBC, Ferritin and Retic count pending    06/14/2022: H&H Improved to 9.2/30. Hypotension and dizziness resolved.  -Ferrous sulfate 325 mg one tab by mouth BID  -Vitamin C 500 mg one tab by mouth daily  -Colace 100 mg one cap at HS to prevent constipation  -Referral to GI made for evaluation of anemia/hemoccult +  -Follow up with PCP with in one week for repeat H&H

## 2022-07-15 NOTE — ED PROVIDER NOTES
Encounter Date: 7/15/2022       History     Chief Complaint   Patient presents with    Vomiting    Hypotension     Mr. Roman Norman is an 81 y/o WM who is a resident at [facility] is brought to the ED by [facility] for nausea/vomiting and hypotensive. EMS reports palpable systolic bp of 110.  [facility] staff reports patient has vomited multiple times during the night, but his morning had small amount of bright red blood. Gave Zofran at 2:30 am and patient continued to have vomiting.  Vitals signs were 70/42, HR 80, T 97.7 and O2 sat 93% on room air at nursing home. Patient has history of anemia with hemoccult + and received blood transfusion one month ago here at [facility]. He was scheduled for colonoscopy by Dr. Benitez on 07/05, but patient did not receive entire prep. Colonoscopy was rescheduled for 07/26.  Upon arrival to ED patient denies any nausea, abdominal pain or dizziness.     The history is provided by the patient, the nursing home and the EMS personnel.     Review of patient's allergies indicates:  No Known Allergies  Past Medical History:   Diagnosis Date    Chronic kidney disease, unspecified     COVID     Diabetes mellitus     Dysphagia following cerebrovascular accident     Falls frequently     Muscle weakness     Renal disorder     Stroke      History reviewed. No pertinent surgical history.  Family History   Family history unknown: Yes     Social History     Tobacco Use    Smoking status: Unknown If Ever Smoked    Smokeless tobacco: Never Used   Substance Use Topics    Alcohol use: Not Currently    Drug use: Not Currently     Review of Systems   Constitutional: Negative.  Negative for activity change, appetite change, fatigue and fever.   HENT: Positive for hearing loss. Negative for congestion, drooling, ear pain, sore throat and trouble swallowing.         [place]   Eyes: Negative.  Negative for pain and visual disturbance.   Respiratory: Negative.  Negative for cough and  shortness of breath.    Cardiovascular: Negative.  Negative for chest pain and leg swelling.   Gastrointestinal: Positive for nausea and vomiting. Negative for abdominal pain, blood in stool, constipation and diarrhea.   Genitourinary: Negative.  Negative for dysuria and frequency.   Musculoskeletal: Negative.    Skin: Positive for pallor. Negative for color change.   Neurological: Positive for weakness (generalized). Negative for dizziness, speech difficulty, light-headedness and headaches.   Hematological: Negative.    Psychiatric/Behavioral: Negative.        Physical Exam     Initial Vitals [07/15/22 0715]   BP Pulse Resp Temp SpO2   (!) 90/53 80 14 98.1 °F (36.7 °C) 98 %      MAP       --         Physical Exam    Nursing note and vitals reviewed.  Constitutional: He appears well-developed and well-nourished. He is cooperative. No distress.   HENT:   Head: Normocephalic and atraumatic.   Right Ear: External ear normal. Decreased hearing is noted.   Left Ear: External ear normal. Decreased hearing is noted.   Nose: Nose normal.   Mouth/Throat: Uvula is midline and oropharynx is clear and moist. Mucous membranes are dry.   [place] (chronic)   Eyes: Conjunctivae and lids are normal. Pupils are equal, round, and reactive to light.   Neck: Neck supple.   Normal range of motion.  Cardiovascular: Normal rate, regular rhythm, normal heart sounds and normal pulses.   No edema in BLE   Pulmonary/Chest: Effort normal and breath sounds normal.   Abdominal: Abdomen is soft and flat. Bowel sounds are normal. There is no abdominal tenderness.   Genitourinary:    Genitourinary Comments: Rectum no redness or rashes noted, few hemorrhoid tags present.     Musculoskeletal:      Cervical back: Normal range of motion and neck supple.     Lymphadenopathy:     He has no cervical adenopathy.   Neurological: He is alert. No cranial nerve deficit. GCS eye subscore is 4. GCS verbal subscore is 5. GCS motor subscore is 6.   Alert and oriented  to person only which is patient's base line.   Skin: Skin is warm, dry and intact. Capillary refill takes less than 2 seconds. No rash noted.        Psychiatric: He has a normal mood and affect. His speech is normal and behavior is normal. Cognition and memory are normal.         Medical Screening Exam   See Full Note    ED Course   Procedures  Labs Reviewed   COMPREHENSIVE METABOLIC PANEL - Abnormal; Notable for the following components:       Result Value    Glucose 123 (*)     BUN 25 (*)     BUN/Creatinine Ratio 26 (*)     Albumin 2.2 (*)     Globulin 4.4 (*)     ALT 9 (*)     AST 13 (*)     All other components within normal limits   CBC WITH DIFFERENTIAL - Abnormal; Notable for the following components:    RBC 3.87 (*)     Hemoglobin 9.8 (*)     Hematocrit 32.4 (*)     MCH 25.3 (*)     MCHC 30.2 (*)     RDW 18.1 (*)     MPV 9.0 (*)     Neutrophils % 84.9 (*)     Lymphocytes % 9.2 (*)     Lymphocytes, Absolute 0.83 (*)     Eosinophils % 0.4 (*)     All other components within normal limits   LIPASE - Abnormal; Notable for the following components:    Lipase 15 (*)     All other components within normal limits   TROPONIN I - Normal   MAGNESIUM - Normal   CBC W/ AUTO DIFFERENTIAL    Narrative:     The following orders were created for panel order CBC auto differential.  Procedure                               Abnormality         Status                     ---------                               -----------         ------                     CBC with Differential[[id number]]        Abnormal            Final result                 Please view results for these tests on the individual orders.   URINALYSIS, REFLEX TO URINE CULTURE     EKG Readings: (Independently Interpreted)   Initial Reading: No STEMI. Rhythm: Multifocal Atrial Tachycardia. Heart Rate: 82. Conduction: RBBB. Other Findings: Prolonged QT Interval.     ECG Results          EKG 12-lead (In process)  Result time 07/15/22 08:08:12    In process by  Interface, Lab In [facility] (07/15/22 08:08:12)                 Narrative:    Test Reason : I95.9,    Vent. Rate : 082 BPM     Atrial Rate : 082 BPM     P-R Int : 176 ms          QRS Dur : 140 ms      QT Int : 414 ms       P-R-T Axes : 045 -58 -03 degrees     QTc Int : 483 ms    Normal sinus rhythm  Right bundle branch block  Left anterior fascicular block   Bifascicular block   Abnormal ECG  When compared with ECG of 13-JUN-2022 20:14,  Premature atrial complexes are no longer Present    Referred By: [email or web address]   SELF           Confirmed By:                             Imaging Results          X-Ray Chest AP Portable (Final result)  Result time 07/15/22 08:18:19    Final result by Juanjose Zacarias MD (07/15/22 08:18:19)                 Impression:      Low lung volumes with basilar atelectatic changes.      Electronically signed by: Juanjose Zacarias  Date:    07/15/2022  Time:    08:18             Narrative:    EXAMINATION:  XR CHEST AP PORTABLE    CLINICAL HISTORY:  Hypotension;    TECHNIQUE:  Single frontal view of the chest was performed.    COMPARISON:  06/13/2022    FINDINGS:  There are low lung volumes with basilar atelectatic change.  The upper lungs appear clear.  No pneumothorax.                                 Medications   sodium chloride 0.9% bolus 1,000 mL (0 mLs Intravenous Stopped 7/15/22 0933)   sodium chloride 0.9% bolus 1,000 mL (0 mLs Intravenous Stopped 7/15/22 1030)     Medical Decision Making:   Clinical Tests:   Lab Tests: Ordered and Reviewed  The following lab test(s) were unremarkable: CBC, Urinalysis, Troponin and Lipase       <> Summary of Lab: H&H 9.8/32.4 (previous H&H from 06/14 9.2/30) BUN/CR today 25/0.07, BUN/CR ratio 26. Previous BUN/CR 14/0.8 from 06/14.  Unable to obtain urine sample. Nurse attempted to cath patient, but unable to pass catheter.   Radiological Study: Ordered and Reviewed  Medical Tests: Ordered and Reviewed  ED Management:  10:45 Patient has not had any nausea or vomiting  while in the ED. He has received 2 liters of IV fluids to rehydrate. He tolerated po fluid challenge and had no nausea or vomiting. BP improved to 129/64 with HR 76. H&H stable at 9.8/32.4. Patient is stable and tolerating po fluids.  Will discharge patient back to [facility].                    Clinical Impression:   Final diagnoses:  [R11.10] Vomiting, intractability of vomiting not specified, presence of nausea not specified, unspecified vomiting type  [I95.9] Hypotension  [E86.0] Dehydration (Primary)          ED Disposition Condition    Discharge Stable        ED Prescriptions     None        Follow-up Information     Follow up With Specialties Details Why Contact Info    Wiliam Torres MD Family Medicine   [address] [place] [id number] [phone]                      Parul Styles, LETY  07/15/22 4641

## 2022-07-15 NOTE — ED TRIAGE NOTES
Presents from [facility] from [facility].  Reports vomited all night with some bright red vomitus this am.  Reports low b/p

## 2022-07-15 NOTE — ED NOTES
Dried BM cleaned from perineum. Baseball sized area of redness (nonblanchable) noted on sacrum. No rectal prolapse noted. In and out catheter attempted. Blood noted from meatus following insertion. Unable to advance catheter past 3-4 inches. Catheter removed. Blood cleaned around meatus. Patient combative at time of procedure. Clean brief applied and patient repositioned for comfort. Resting quietly on left side.

## 2022-07-15 NOTE — DISCHARGE INSTRUCTIONS
-Continue Zofran at nursing home as needed for nausea/vomiting if it returns.  -Offer po fluids frequently through out the day.  -Keep follow appointment with Dr. Benitez for colonoscopy as scheduled.

## 2022-08-14 NOTE — ED PROVIDER NOTES
Encounter Date: 8/14/2022       History     Chief Complaint   Patient presents with    Shortness of Breath     82 y-old  male received to room 1 with complaint of decreased responsiveness and decreased respirations per [facility] report. [facility] states that when they arrived on-scene that patients respiratory rate was even/nonlabored. Patient is hard of hearing. Will follow simple commands.         Review of patient's allergies indicates:  No Known Allergies  Past Medical History:   Diagnosis Date    Chronic kidney disease, unspecified     COVID     Diabetes mellitus     Dysphagia following cerebrovascular accident     Falls frequently     Muscle weakness     Renal disorder     Stroke      History reviewed. No pertinent surgical history.  Family History   Family history unknown: Yes     Social History     Tobacco Use    Smoking status: Unknown If Ever Smoked    Smokeless tobacco: Never Used   Substance Use Topics    Alcohol use: Not Currently    Drug use: Not Currently     Review of Systems   Unable to perform ROS: Patient nonverbal       Physical Exam     Initial Vitals [08/14/22 1825]   BP Pulse Resp Temp SpO2   (!) 92/55 84 20 97.7 °F (36.5 °C) 97 %      MAP       --         Physical Exam    Constitutional: He appears well-developed and well-nourished. He is not diaphoretic. No distress.   HENT:   Head: Normocephalic and atraumatic.   Right Ear: External ear normal.   Left Ear: External ear normal.   Nose: Nose normal.   Mouth/Throat: Oropharynx is clear and moist. No oropharyngeal exudate.   Eyes: Conjunctivae are normal. Right eye exhibits no discharge. Left eye exhibits no discharge. No scleral icterus.   Neck: Neck supple.   Cardiovascular: Normal rate, regular rhythm, normal heart sounds and intact distal pulses. Exam reveals no gallop and no friction rub.    No murmur heard.  Pulmonary/Chest: Breath sounds normal. No respiratory distress. He has no wheezes. He has no rhonchi.  He has no rales. He exhibits no tenderness.   Abdominal: Abdomen is soft. Bowel sounds are normal. He exhibits no distension and no mass. There is no abdominal tenderness. There is no rebound and no guarding.   Musculoskeletal:         General: No tenderness or edema. Normal range of motion.      Cervical back: Neck supple.     Neurological: He is alert and oriented to person, place, and time. He has normal strength. GCS score is 15. GCS eye subscore is 4. GCS verbal subscore is 5. GCS motor subscore is 6.   Patient has flat affect. Reported that the patient is extremely hard of hearing   Skin: Skin is warm. Capillary refill takes less than 2 seconds.   Psychiatric: He has a normal mood and affect. His behavior is normal. Judgment and thought content normal.         Medical Screening Exam   See Full Note    ED Course   Procedures  Labs Reviewed   COMPREHENSIVE METABOLIC PANEL - Abnormal; Notable for the following components:       Result Value    Sodium 150 (*)     Chloride 111 (*)     Glucose 72 (*)     BUN 33 (*)     BUN/Creatinine Ratio 32 (*)     Albumin 2.2 (*)     Globulin 5.3 (*)     ALT 5 (*)     AST 12 (*)     All other components within normal limits   CBC WITH DIFFERENTIAL - Abnormal; Notable for the following components:    RBC 3.96 (*)     Hemoglobin 10.2 (*)     Hematocrit 35.7 (*)     MCH 25.8 (*)     MCHC 28.6 (*)     RDW 21.0 (*)     MPV 9.3 (*)     Neutrophils % 66.2 (*)     Lymphocytes % 24.2 (*)     Monocytes % 7.4 (*)     Basophils % 1.1 (*)     All other components within normal limits   MANUAL DIFFERENTIAL - Abnormal; Notable for the following components:    Segmented Neutrophils, Man % 67 (*)     All other components within normal limits   TROPONIN I - Normal   MAGNESIUM - Normal   CBC W/ AUTO DIFFERENTIAL    Narrative:     The following orders were created for panel order CBC auto differential.  Procedure                               Abnormality         Status                     ---------                                -----------         ------                     CBC with Differential[[id number]]        Abnormal            Final result               Manual Differential[[id number]]          Abnormal            Final result                 Please view results for these tests on the individual orders.        ECG Results          EKG 12-lead (In process)  Result time 08/14/22 18:59:36    In process by Interface, Lab In [facility] (08/14/22 18:59:36)                 Narrative:    Test Reason : Z86.59,    Vent. Rate : 083 BPM     Atrial Rate : 083 BPM     P-R Int : 154 ms          QRS Dur : 142 ms      QT Int : 424 ms       P-R-T Axes : 062 -72 054 degrees     QTc Int : 498 ms    Normal sinus rhythm  Right bundle branch block  Left anterior fascicular block   Bifascicular block   Abnormal ECG  When compared with ECG of 15-JUL-2022 08:01,  No significant change was found    Referred By:             Confirmed By:                             Imaging Results          CT Head Without Contrast (Final result)  Result time 08/14/22 20:04:36    Final result by Aaron Dunbar MD (08/14/22 20:04:36)                 Impression:      No acute intracranial process    Large left MCA distribution infarct as before    Cerebral atrophy and periventricular small vessel disease      Electronically signed by: Aaron Dunbar  Date:    08/14/2022  Time:    20:04             Narrative:    EXAMINATION:  CT head without contrast    CLINICAL HISTORY:  Mental status change, unknown cause;    TECHNIQUE:  Transaxial CT sections were obtained through the brain without contrast.    The CT examination was performed using one or more of the following dose reduction techniques: Automated exposure control, adjustment of the mA and kV according to patient's size, use of acute or iterative reconstruction techniques.    COMPARISON:  CT head April 10, 2022    FINDINGS:  The ventricles are midline in position without evidence of hydrocephalus.  There is no mass or area of parenchymal hemorrhage.  There is moderate diffuse cerebral atrophy.  There is a large area of chronic left MCA distribution ischemia involving the left parietal and temporal regions as before.  There is a small amount of ill-defined low-density in the periventricular white matter without mass effect compatible with changes of small vessel disease.  There is no gross CT evidence of acute cortical stroke. There is no extra-axial hematoma. The partially visualized sinuses are generally clear. There is no obvious skull fracture.                               X-Ray Chest AP Portable (Final result)  Result time 08/14/22 20:08:10    Final result by Aaron Dunbar MD (08/14/22 20:08:10)                 Impression:      No acute cardiopulmonary process compared to the previous study      Electronically signed by: Aaron Dunbar  Date:    08/14/2022  Time:    20:08             Narrative:    EXAMINATION:  XR CHEST AP PORTABLE    CLINICAL HISTORY:  .  Personal history of other mental and behavioral disorders    COMPARISON:  July 15, 2022    TECHNIQUE:  Chest x-ray AP portable upright    FINDINGS:  The cardiac silhouette is not enlarged.  There is no mediastinal mass.  There is no pulmonary vascular engorgement.    The lungs are well expanded and generally clear.  There is no gross pleural effusion.    Osseous structures are unchanged                                 Medications   0.9%  NaCl infusion (1,000 mLs Intravenous New Bag 8/14/22 2029)     Medical Decision Making:   ED Management:  Patient is very hard of hearing. Follows commands. NAD noted. Sodium is 150. I called ans spoke with Addie Chandler the nurse at [facility]. Nurse states that patient has started refusing PO intake. I instructed staff to increase patients po intake of fluids and contact the patients PCP in the AM for further evaluation or return to the ED if any new or worsening symptoms or otherwise as needed. Nurse Cnostanza  "Carlos spoke with daughter Nicolle who states that the patient has had a steady decline over the past month and has started "not wanting to eat or drink."   2228 Wife at bedside. I updated her on patients condition and POC. Patient is to increase fluids and f/u with his PCP or return if symptoms worsen/fail to improve or otherwise as needed. Wife verbalizes understanding and agrees with plan.              ED Course as of 08/14/22 2237   Sun Aug 14, 2022   2100 Sodium(!): 150 [SH]      ED Course User Index  [SH] LACIE Corcoran          Clinical Impression:   Final diagnoses:  [Z86.59] Mental status change resolved  [E86.0] Dehydration (Primary)          ED Disposition Condition    Discharge Stable        ED Prescriptions     None        Follow-up Information     Follow up With Specialties Details Why Contact Info    Wiliam Torres MD Family Medicine In 1 day  [address] [place] [id number]  [phone]             LACIE Corcoran  08/14/22 2237    "

## 2022-08-14 NOTE — ED TRIAGE NOTES
81 YO MALE TO ER FROM [facility] WHO REPORTS THE PT WAS HAVING SHALLOW RESP AT 5?MIN, EMS REPORTS UPON ARRIVAL PT HAS NORMAL RESP WITHOUT DISTRESS, ALERT TO SURROUNDINGS

## 2022-08-15 NOTE — DISCHARGE INSTRUCTIONS
Increase fluids. Offer patient PO intake at every round. Call patients PCP in the AM for further evaluation. Patient has been declining over the past month or greater per family and staff. May need swallowing eval.

## 2022-08-15 NOTE — ED NOTES
[facility] TO [place] ROOM [address]A

## 2022-09-01 ENCOUNTER — TELEPHONE (OUTPATIENT)
Dept: GASTROENTEROLOGY | Facility: HOSPITAL | Age: 82
End: 2022-09-01
Payer: MEDICARE

## 2023-02-08 NOTE — ED PROVIDER NOTES
Encounter Date: 2/27/2022       History     Chief Complaint   Patient presents with    Shortness of Breath    Weakness     Mr. Roman Norman is an 83 y/o WM who is a resident at [facility] with past medical history of CVA, dysphagia related to CVA, [place], and COVID (1.5 weeks ago) sent to the ED by EMS for hypoxia, cough and lethargic. [place] nursing staff reports patient found to have O2 sat 88% which improved to 93% on O2 at 5L/NC, increase in cough and coarse breath sounds.     Patient denies any nausea, vomiting or diarrhea. Denies any SOB, dizziness or chest pain.        Review of patient's allergies indicates:  No Known Allergies  Past Medical History:   Diagnosis Date    Stroke      History reviewed. No pertinent surgical history.  History reviewed. No pertinent family history.  Social History     Tobacco Use    Smoking status: Never Smoker    Smokeless tobacco: Never Used   Substance Use Topics    Alcohol use: Never     Review of Systems   Constitutional: Positive for activity change, appetite change and fatigue. Negative for fever.   HENT: Negative.  Negative for congestion, ear pain, sinus pressure, sore throat and trouble swallowing.    Eyes: Negative for pain and visual disturbance.   Respiratory: Positive for cough. Negative for shortness of breath.    Cardiovascular: Negative.  Negative for chest pain.   Gastrointestinal: Negative.  Negative for abdominal pain, diarrhea, nausea and vomiting.   Genitourinary: Negative for dysuria.   Skin: Negative.  Negative for rash.   Neurological: Positive for weakness. Negative for dizziness and headaches.   Hematological: Negative.    Psychiatric/Behavioral: Negative.        Physical Exam     Initial Vitals [02/27/22 1757]   BP Pulse Resp Temp SpO2   (!) 88/54 75 16 99.1 °F (37.3 °C) (!) 94 %      MAP       --         Physical Exam    Nursing note and vitals reviewed.  Constitutional: He appears well-developed. He appears lethargic. He is cooperative.  He appears ill. No distress.   HENT:   Head: Normocephalic.   Right Ear: External ear normal.   Left Ear: External ear normal.   [place], bilateral TM obscured by cerumen   Eyes: EOM are normal. Pupils are equal, round, and reactive to light.   Cardiovascular: Normal rate and regular rhythm.   Pulses:       Radial pulses are 2+ on the right side and 2+ on the left side.        Posterior tibial pulses are 1+ on the right side and 1+ on the left side.   No edema BLE   Pulmonary/Chest: Effort normal. He has decreased breath sounds. He has rhonchi in the right lower field and the left lower field.   Abdominal: Abdomen is soft. Bowel sounds are normal. There is no abdominal tenderness.     Neurological: He appears lethargic. He is disoriented.   Skin: Skin is warm and dry. Capillary refill takes 2 to 3 seconds. No rash noted.   Psychiatric: He has a normal mood and affect. His behavior is normal.         Medical Screening Exam   See Full Note    ED Course   Procedures  Labs Reviewed   COMPREHENSIVE METABOLIC PANEL - Abnormal; Notable for the following components:       Result Value    Sodium 146 (*)     BUN 25 (*)     BUN/Creatinine Ratio 30 (*)     Albumin 2.2 (*)     Globulin 5.5 (*)     ALT 10 (*)     All other components within normal limits   NT-PRO NATRIURETIC PEPTIDE - Abnormal; Notable for the following components:    ProBNP 549 (*)     All other components within normal limits   URINALYSIS, REFLEX TO URINE CULTURE - Abnormal; Notable for the following components:    Clarity, UA Cloudy (*)     Nitrites, UA Positive (*)     Protein, UA Trace (*)     Ketones, UA 15  (*)     Bilirubin, UA Moderate (*)     Specific Gravity, UA >=1.030 (*)     All other components within normal limits   CBC WITH DIFFERENTIAL - Abnormal; Notable for the following components:    RBC 3.27 (*)     Hemoglobin 10.6 (*)     Hematocrit 29.1 (*)     MCH 32.4 (*)     MCHC 36.4 (*)     RDW 20.0 (*)     MPV 8.8 (*)     Neutrophils % 70.0 (*)      Home Lymphocytes % 18.6 (*)     Lymphocytes, Absolute 0.86 (*)     Monocytes % 9.7 (*)     All other components within normal limits   MANUAL DIFFERENTIAL - Abnormal; Notable for the following components:    Segmented Neutrophils, Man % 72 (*)     Lymphocytes, Man % 17 (*)     Monocytes, Man % 8 (*)     All other components within normal limits   URINALYSIS, MICROSCOPIC - Abnormal; Notable for the following components:    RBC, UA 5-10 (*)     Bacteria, UA Few (*)     Squamous Epithelial Cells, UA Few (*)     Mucus, UA Moderate (*)     All other components within normal limits   LACTIC ACID, PLASMA - Normal   CULTURE, BLOOD   CULTURE, BLOOD   CBC W/ AUTO DIFFERENTIAL    Narrative:     The following orders were created for panel order CBC auto differential.  Procedure                               Abnormality         Status                     ---------                               -----------         ------                     CBC with Differential[[id number]]        Abnormal            Final result               Manual Differential[[id number]]          Abnormal            Final result                 Please view results for these tests on the individual orders.        ECG Results          Repeat EKG 12-lead (In process)  Result time 02/27/22 17:47:06    In process by Interface, Lab In [facility] (02/27/22 17:47:06)                 Narrative:    Test Reason : I95.9,    Vent. Rate : 077 BPM     Atrial Rate : 077 BPM     P-R Int : 160 ms          QRS Dur : 138 ms      QT Int : 414 ms       P-R-T Axes : 053 -19 044 degrees     QTc Int : 468 ms    Normal sinus rhythm  Right bundle branch block  Abnormal ECG  No previous ECGs available    Referred By: [email or web address]   SELF           Confirmed By:                             Imaging Results          X-Ray Chest AP Portable (Final result)  Result time 02/27/22 17:44:30    Final result by Juanjose Zacarias MD (02/27/22 17:44:30)                 Impression:      Patchy left basilar densities  concerning for infectious/inflammatory process      Electronically signed by: Juanjose Zacarias  Date:    02/27/2022  Time:    17:44             Narrative:    EXAMINATION:  XR CHEST AP PORTABLE    CLINICAL HISTORY:  cough;    TECHNIQUE:  Single frontal view of the chest was performed.    COMPARISON:  None    FINDINGS:  The cardiac silhouette is within normal limits.  There are patchy densities in the left lung base.  Lungs otherwise clear.  No pneumothorax.                                 Medications - No data to display  Medical Decision Making:   ED Management:  Pharmacy consulted at 1945. Spoke with Mariia at [facility]. Will start Zosyn 4.5gm IV while in ED. Pharmacy to dose antibiotics.   Other:   I have discussed this case with another health care provider.       <> Summary of the Discussion: Spoke with Dr. Paulina Beltrán at [facility] at 1930. Plan is to gently hydrate with NS at 100ml/hr, treat for community aquired pneumonia and UTI with Zosyn 4.5 and Vancomycin 1GM IV with pharmacy to help dose. Medication reconcilliation also completed with Dr. Beltrán at this time. MD is aware and agrees with plan.                    Clinical Impression:   Final diagnoses:  [I95.9] Hypotension  [J18.9] Pneumonia of left lower lobe due to infectious organism (Primary)  [N17.9] FRANCISCO (acute kidney injury)          ED Disposition Condition    Admit               LACIE Corcoran  02/27/22 1940       LACIE Corcoran  02/27/22 1955